# Patient Record
Sex: FEMALE | Race: WHITE | Employment: FULL TIME | URBAN - METROPOLITAN AREA
[De-identification: names, ages, dates, MRNs, and addresses within clinical notes are randomized per-mention and may not be internally consistent; named-entity substitution may affect disease eponyms.]

---

## 2017-02-23 ENCOUNTER — LAB ENCOUNTER (OUTPATIENT)
Dept: LAB | Age: 42
End: 2017-02-23
Attending: FAMILY MEDICINE
Payer: COMMERCIAL

## 2017-02-23 DIAGNOSIS — R53.83 FATIGUE: ICD-10-CM

## 2017-02-23 DIAGNOSIS — E34.9 ENDOCRINE DISORDER: Primary | ICD-10-CM

## 2017-02-23 DIAGNOSIS — E03.9 HYPOTHYROIDISM: ICD-10-CM

## 2017-02-23 DIAGNOSIS — E55.9 VITAMIN D DEFICIENCY: ICD-10-CM

## 2017-02-23 LAB
25-HYDROXYVITAMIN D (TOTAL): 31.7 NG/ML (ref 30–100)
DEPRECATED HBV CORE AB SER IA-ACNC: 47.2 NG/ML (ref 10–291)
FREE T4: 0.9 NG/DL (ref 0.9–1.8)
T3FREE SERPL-MCNC: 2.82 PG/ML (ref 2.3–4.2)
TSI SER-ACNC: 0.46 MIU/ML (ref 0.35–5.5)

## 2017-02-23 PROCEDURE — 82728 ASSAY OF FERRITIN: CPT

## 2017-02-23 PROCEDURE — 84443 ASSAY THYROID STIM HORMONE: CPT

## 2017-02-23 PROCEDURE — 84439 ASSAY OF FREE THYROXINE: CPT

## 2017-02-23 PROCEDURE — 36415 COLL VENOUS BLD VENIPUNCTURE: CPT

## 2017-02-23 PROCEDURE — 82306 VITAMIN D 25 HYDROXY: CPT

## 2017-02-23 PROCEDURE — 84481 FREE ASSAY (FT-3): CPT

## 2017-05-22 ENCOUNTER — HOSPITAL ENCOUNTER (OUTPATIENT)
Dept: ULTRASOUND IMAGING | Age: 42
Discharge: HOME | End: 2017-05-22
Attending: FAMILY MEDICINE
Payer: COMMERCIAL

## 2017-05-22 ENCOUNTER — LAB ENCOUNTER (OUTPATIENT)
Dept: LAB | Age: 42
End: 2017-05-22
Attending: FAMILY MEDICINE
Payer: COMMERCIAL

## 2017-05-22 DIAGNOSIS — E03.9 HYPOTHYROIDISM (ACQUIRED): ICD-10-CM

## 2017-05-22 DIAGNOSIS — E03.9 HYPOTHYROIDISM: ICD-10-CM

## 2017-05-22 DIAGNOSIS — R53.83 FATIGUE: ICD-10-CM

## 2017-05-22 DIAGNOSIS — E34.9 ENDOCRINE DISEASE: Primary | ICD-10-CM

## 2017-05-22 DIAGNOSIS — Z51.81 ENCOUNTER FOR THERAPEUTIC DRUG MONITORING: ICD-10-CM

## 2017-05-22 PROCEDURE — 84482 T3 REVERSE: CPT

## 2017-05-22 PROCEDURE — 84439 ASSAY OF FREE THYROXINE: CPT

## 2017-05-22 PROCEDURE — 36415 COLL VENOUS BLD VENIPUNCTURE: CPT

## 2017-05-22 PROCEDURE — 84481 FREE ASSAY (FT-3): CPT

## 2017-05-22 PROCEDURE — 82607 VITAMIN B-12: CPT

## 2017-05-22 PROCEDURE — 76536 US EXAM OF HEAD AND NECK: CPT | Performed by: FAMILY MEDICINE

## 2017-05-22 PROCEDURE — 84443 ASSAY THYROID STIM HORMONE: CPT

## 2017-05-22 PROCEDURE — 84480 ASSAY TRIIODOTHYRONINE (T3): CPT

## 2017-05-22 PROCEDURE — 84630 ASSAY OF ZINC: CPT

## 2017-08-09 ENCOUNTER — LAB ENCOUNTER (OUTPATIENT)
Dept: LAB | Age: 42
End: 2017-08-09
Attending: FAMILY MEDICINE
Payer: COMMERCIAL

## 2017-08-09 DIAGNOSIS — E03.9 PRIMARY HYPOTHYROIDISM: ICD-10-CM

## 2017-08-09 DIAGNOSIS — Z51.81 ENCOUNTER FOR THERAPEUTIC DRUG MONITORING: ICD-10-CM

## 2017-08-09 DIAGNOSIS — E34.9 ENDOCRINE DISEASE: Primary | ICD-10-CM

## 2017-08-09 DIAGNOSIS — R53.83 FATIGUE: ICD-10-CM

## 2017-08-09 LAB
FREE T4: 0.5 NG/DL (ref 0.9–1.8)
HAV AB SERPL IA-ACNC: 721 PG/ML (ref 193–986)
T3 SERPL-MCNC: 144 NG/DL (ref 60–181)
T3FREE SERPL-MCNC: 4.33 PG/ML (ref 2.3–4.2)
TSI SER-ACNC: 0.29 MIU/ML (ref 0.35–5.5)

## 2017-08-09 PROCEDURE — 84443 ASSAY THYROID STIM HORMONE: CPT

## 2017-08-09 PROCEDURE — 82607 VITAMIN B-12: CPT

## 2017-08-09 PROCEDURE — 84480 ASSAY TRIIODOTHYRONINE (T3): CPT

## 2017-08-09 PROCEDURE — 84481 FREE ASSAY (FT-3): CPT

## 2017-08-09 PROCEDURE — 84482 T3 REVERSE: CPT

## 2017-08-09 PROCEDURE — 84439 ASSAY OF FREE THYROXINE: CPT

## 2017-08-09 PROCEDURE — 36415 COLL VENOUS BLD VENIPUNCTURE: CPT

## 2017-08-09 PROCEDURE — 84630 ASSAY OF ZINC: CPT

## 2017-08-11 LAB — ZINC: 88 UG/DL

## 2017-08-12 LAB — TRIIODOTHYRONINE, REVERSE: 2.9 NG/DL

## 2017-08-29 ENCOUNTER — HOSPITAL ENCOUNTER (OUTPATIENT)
Dept: MAMMOGRAPHY | Age: 42
Discharge: HOME | End: 2017-08-29
Attending: OBSTETRICS & GYNECOLOGY
Payer: COMMERCIAL

## 2017-08-29 DIAGNOSIS — Z12.31 VISIT FOR SCREENING MAMMOGRAM: ICD-10-CM

## 2017-08-29 PROCEDURE — 77067 SCR MAMMO BI INCL CAD: CPT | Performed by: OBSTETRICS & GYNECOLOGY

## 2017-09-06 ENCOUNTER — OFFICE VISIT (OUTPATIENT)
Dept: FAMILY MEDICINE CLINIC | Facility: CLINIC | Age: 42
End: 2017-09-06

## 2017-09-06 VITALS
SYSTOLIC BLOOD PRESSURE: 110 MMHG | DIASTOLIC BLOOD PRESSURE: 70 MMHG | WEIGHT: 125 LBS | HEIGHT: 62.25 IN | RESPIRATION RATE: 16 BRPM | HEART RATE: 72 BPM | BODY MASS INDEX: 22.71 KG/M2 | TEMPERATURE: 97 F

## 2017-09-06 DIAGNOSIS — Z23 FLU VACCINE NEED: ICD-10-CM

## 2017-09-06 DIAGNOSIS — R53.83 OTHER FATIGUE: ICD-10-CM

## 2017-09-06 DIAGNOSIS — Z00.00 WELLNESS EXAMINATION: Primary | ICD-10-CM

## 2017-09-06 PROCEDURE — 90471 IMMUNIZATION ADMIN: CPT | Performed by: NURSE PRACTITIONER

## 2017-09-06 PROCEDURE — 90686 IIV4 VACC NO PRSV 0.5 ML IM: CPT | Performed by: NURSE PRACTITIONER

## 2017-09-06 PROCEDURE — 99396 PREV VISIT EST AGE 40-64: CPT | Performed by: NURSE PRACTITIONER

## 2017-09-06 NOTE — PROGRESS NOTES
Wellness Exam    REASON FOR VISIT:    Maria De Jesus Silvestre is a 38year old female who presents for an [address]     Current Complaints: none  Flu shot: would like to get today  Health Maintenance Topics with due status: Overdue       Topic Date HPV every 5 yrs age 33-67 Pap Smear,2 Years due on 09/09/2016    Chlamydia Screening Screen Annually age<25, if sex active/on OCPs; >24 high risk No results found for: CHLAMYDIA    Colonoscopy Screen Every 10 years There are no preventive care reminders to INFORMATION:   Past Medical History:   Diagnosis Date   • Allergic rhinitis    • Hypothyroidism       Past Surgical History:  No date:   2016: OTHER SURGICAL HISTORY      Comment: Maureen Grande   Family History   Problem Relation Age of Onset   • rhythm and normal heart sounds. Exam reveals no friction rub. No murmur heard. Pulmonary/Chest: Effort normal and breath sounds normal. She has no wheezes. She has no rales. Abdominal: Soft. Bowel sounds are normal. There is no tenderness.    Musculo ML    Imaging & Consults:  FLULAVAL INFLUENZA VACCINE QUAD PRESERVATIVE FREE 0.5 ML    Her 5 year prevention plan includes: annual visits for fasting labs  Annual Depression Screen due on 06/06/1977  Annual Physical due on 06/06/1979  Pap Smear,3 Years due

## 2017-09-06 NOTE — PATIENT INSTRUCTIONS
Thank you for choosing [facility] at [place] [address]  To Do: Wilmot Just  1. Go to the lab for blood work   2.  Go to the nurse for flu vaccination      Effective 6/19/17 until November 2017  Due to [employer] is being move harm or side effects or medication interactions.  It is your duty and for your safety to discuss with the pharmacist and our office with questions, and to notify us and stop treatment if problems arise, but know that our intention is that the benefits outw

## 2017-09-07 ENCOUNTER — LAB ENCOUNTER (OUTPATIENT)
Dept: LAB | Age: 42
End: 2017-09-07
Attending: NURSE PRACTITIONER
Payer: COMMERCIAL

## 2017-09-07 DIAGNOSIS — Z00.00 WELLNESS EXAMINATION: ICD-10-CM

## 2017-09-07 LAB
ALBUMIN SERPL-MCNC: 3.7 G/DL (ref 3.5–4.8)
ALP LIVER SERPL-CCNC: 39 U/L (ref 37–98)
ALT SERPL-CCNC: 17 U/L (ref 14–54)
AST SERPL-CCNC: 10 U/L (ref 15–41)
BASOPHILS # BLD AUTO: 0.08 X10(3) UL (ref 0–0.1)
BASOPHILS NFR BLD AUTO: 1.3 %
BILIRUB SERPL-MCNC: 0.4 MG/DL (ref 0.1–2)
BILIRUB UR QL STRIP.AUTO: NEGATIVE
BUN BLD-MCNC: 18 MG/DL (ref 8–20)
CALCIUM BLD-MCNC: 8.9 MG/DL (ref 8.3–10.3)
CHLORIDE: 107 MMOL/L (ref 101–111)
CHOLEST SMN-MCNC: 138 MG/DL (ref ?–200)
CLARITY UR REFRACT.AUTO: CLEAR
CO2: 27 MMOL/L (ref 22–32)
COLOR UR AUTO: YELLOW
CREAT BLD-MCNC: 0.65 MG/DL (ref 0.55–1.02)
EOSINOPHIL # BLD AUTO: 0.09 X10(3) UL (ref 0–0.3)
EOSINOPHIL NFR BLD AUTO: 1.4 %
ERYTHROCYTE [DISTWIDTH] IN BLOOD BY AUTOMATED COUNT: 11.8 % (ref 11.5–16)
GLUCOSE BLD-MCNC: 82 MG/DL (ref 70–99)
GLUCOSE UR STRIP.AUTO-MCNC: NEGATIVE MG/DL
HCT VFR BLD AUTO: 44.4 % (ref 34–50)
HDLC SERPL-MCNC: 81 MG/DL (ref 45–?)
HDLC SERPL: 1.7 {RATIO} (ref ?–4.44)
HGB BLD-MCNC: 14.7 G/DL (ref 12–16)
IMMATURE GRANULOCYTE COUNT: 0.03 X10(3) UL (ref 0–1)
IMMATURE GRANULOCYTE RATIO %: 0.5 %
KETONES UR STRIP.AUTO-MCNC: NEGATIVE MG/DL
LDLC SERPL CALC-MCNC: 46 MG/DL (ref ?–130)
LDLC SERPL-MCNC: 11 MG/DL (ref 5–40)
LEUKOCYTE ESTERASE UR QL STRIP.AUTO: NEGATIVE
LYMPHOCYTES # BLD AUTO: 1.2 X10(3) UL (ref 0.9–4)
LYMPHOCYTES NFR BLD AUTO: 19 %
M PROTEIN MFR SERPL ELPH: 7.3 G/DL (ref 6.1–8.3)
MCH RBC QN AUTO: 31.7 PG (ref 27–33.2)
MCHC RBC AUTO-ENTMCNC: 33.1 G/DL (ref 31–37)
MCV RBC AUTO: 95.7 FL (ref 81–100)
MONOCYTES # BLD AUTO: 0.44 X10(3) UL (ref 0.1–0.6)
MONOCYTES NFR BLD AUTO: 7 %
NEUTROPHIL ABS PRELIM: 4.47 X10 (3) UL (ref 1.3–6.7)
NEUTROPHILS # BLD AUTO: 4.47 X10(3) UL (ref 1.3–6.7)
NEUTROPHILS NFR BLD AUTO: 70.8 %
NITRITE UR QL STRIP.AUTO: NEGATIVE
NONHDLC SERPL-MCNC: 57 MG/DL (ref ?–130)
PH UR STRIP.AUTO: 7 [PH] (ref 4.5–8)
PLATELET # BLD AUTO: 177 10(3)UL (ref 150–450)
POTASSIUM SERPL-SCNC: 4.3 MMOL/L (ref 3.6–5.1)
PROT UR STRIP.AUTO-MCNC: NEGATIVE MG/DL
RBC # BLD AUTO: 4.64 X10(6)UL (ref 3.8–5.1)
RBC UR QL AUTO: NEGATIVE
RED CELL DISTRIBUTION WIDTH-SD: 40.9 FL (ref 35.1–46.3)
SODIUM SERPL-SCNC: 141 MMOL/L (ref 136–144)
SP GR UR STRIP.AUTO: 1.01 (ref 1–1.03)
TRIGLYCERIDES: 55 MG/DL (ref ?–150)
UROBILINOGEN UR STRIP.AUTO-MCNC: <2 MG/DL
WBC # BLD AUTO: 6.3 X10(3) UL (ref 4–13)

## 2017-09-07 PROCEDURE — 80053 COMPREHEN METABOLIC PANEL: CPT

## 2017-09-07 PROCEDURE — 36415 COLL VENOUS BLD VENIPUNCTURE: CPT

## 2017-09-07 PROCEDURE — 81003 URINALYSIS AUTO W/O SCOPE: CPT

## 2017-09-07 PROCEDURE — 80061 LIPID PANEL: CPT

## 2017-09-07 PROCEDURE — 85025 COMPLETE CBC W/AUTO DIFF WBC: CPT

## 2017-09-08 ENCOUNTER — HOSPITAL ENCOUNTER (OUTPATIENT)
Dept: MAMMOGRAPHY | Age: 42
Discharge: HOME | End: 2017-09-08
Attending: OBSTETRICS & GYNECOLOGY
Payer: COMMERCIAL

## 2017-09-08 ENCOUNTER — HOSPITAL ENCOUNTER (OUTPATIENT)
Dept: ULTRASOUND IMAGING | Age: 42
Discharge: HOME | End: 2017-09-08
Attending: OBSTETRICS & GYNECOLOGY
Payer: COMMERCIAL

## 2017-09-08 DIAGNOSIS — R92.8 ABNORMAL MAMMOGRAM OF BOTH BREASTS: ICD-10-CM

## 2017-09-08 PROCEDURE — 77062 BREAST TOMOSYNTHESIS BI: CPT | Performed by: OBSTETRICS & GYNECOLOGY

## 2017-09-08 PROCEDURE — 76642 ULTRASOUND BREAST LIMITED: CPT | Performed by: OBSTETRICS & GYNECOLOGY

## 2017-09-08 PROCEDURE — 77066 DX MAMMO INCL CAD BI: CPT | Performed by: OBSTETRICS & GYNECOLOGY

## 2017-10-11 PROBLEM — E03.9 ACQUIRED HYPOTHYROIDISM: Status: ACTIVE | Noted: 2017-10-11

## 2017-12-14 ENCOUNTER — OFFICE VISIT (OUTPATIENT)
Dept: FAMILY MEDICINE CLINIC | Facility: CLINIC | Age: 42
End: 2017-12-14

## 2017-12-14 VITALS
DIASTOLIC BLOOD PRESSURE: 70 MMHG | SYSTOLIC BLOOD PRESSURE: 100 MMHG | TEMPERATURE: 100 F | OXYGEN SATURATION: 98 % | HEART RATE: 76 BPM | HEIGHT: 62 IN | WEIGHT: 136 LBS | RESPIRATION RATE: 16 BRPM | BODY MASS INDEX: 25.03 KG/M2

## 2017-12-14 DIAGNOSIS — J01.00 ACUTE MAXILLARY SINUSITIS, RECURRENCE NOT SPECIFIED: Primary | ICD-10-CM

## 2017-12-14 PROCEDURE — 99213 OFFICE O/P EST LOW 20 MIN: CPT | Performed by: NURSE PRACTITIONER

## 2017-12-14 RX ORDER — FLUTICASONE PROPIONATE 50 MCG
SPRAY, SUSPENSION (ML) NASAL
Qty: 1 BOTTLE | Refills: 0 | Status: SHIPPED | OUTPATIENT
Start: 2017-12-14 | End: 2018-03-05

## 2017-12-14 RX ORDER — METHYLPREDNISOLONE 4 MG/1
TABLET ORAL
Qty: 1 KIT | Refills: 0 | Status: SHIPPED | OUTPATIENT
Start: 2017-12-14 | End: 2018-01-16

## 2017-12-14 RX ORDER — AMOXICILLIN AND CLAVULANATE POTASSIUM 875; 125 MG/1; MG/1
1 TABLET, FILM COATED ORAL 2 TIMES DAILY
Qty: 20 TABLET | Refills: 0 | Status: SHIPPED | OUTPATIENT
Start: 2017-12-14 | End: 2017-12-24

## 2017-12-14 NOTE — PROGRESS NOTES
CHIEF COMPLAINT:   Patient presents with:  Sinus Problem: sinus pressure,cough,congestion,headache,sore throat and post nasal drip sx x 2 weeks. HPI:   Mya Sutton is a 38year old female who presents for sinus symptoms for  2  weeks.  Symptoms h Smokeless tobacco: Never Used                      Alcohol use: No                  REVIEW OF SYSTEMS:   GENERAL: feels well otherwise, no unplanned weight change and normal appetite  SKIN: no rashes or abnormal skin lesions  HEENT: See HPI.     LUNGS: thais - Advised F/U visit if no improvement/worsening within 1 week; sooner if new fever or worsening breathing. To ER if ever dyspnea, SOB, chest pain, severe HA, or vision change. - Pt verbalizes understanding and is agreeable w/ plan.     Meds & Refills for · An expectorant containing guaifenesin may help thin the mucus and promote drainage from the sinuses. · Over-the-counter decongestants may be used unless a similar medicine was prescribed.  Nasal sprays work the fastest. Use one that contains phenylephrin © 0439-7610 [address]. [address] [facility], [facility] [place]. All rights reserved. This information is not intended as a substitute for professional medical care. Always follow your healthcare professional's instructions.

## 2018-01-09 ENCOUNTER — APPOINTMENT (OUTPATIENT)
Dept: LAB | Age: 43
End: 2018-01-09
Attending: INTERNAL MEDICINE
Payer: COMMERCIAL

## 2018-01-09 DIAGNOSIS — E03.9 ACQUIRED HYPOTHYROIDISM: ICD-10-CM

## 2018-01-09 LAB
FREE T4: 1.1 NG/DL (ref 0.9–1.8)
T3FREE SERPL-MCNC: 2.48 PG/ML (ref 2.3–4.2)
TSI SER-ACNC: 0.37 MIU/ML (ref 0.35–5.5)

## 2018-01-09 PROCEDURE — 84439 ASSAY OF FREE THYROXINE: CPT

## 2018-01-09 PROCEDURE — 36415 COLL VENOUS BLD VENIPUNCTURE: CPT

## 2018-01-09 PROCEDURE — 84481 FREE ASSAY (FT-3): CPT

## 2018-01-09 PROCEDURE — 84443 ASSAY THYROID STIM HORMONE: CPT

## 2018-01-14 PROBLEM — E03.9 ACQUIRED HYPOTHYROIDISM: Status: RESOLVED | Noted: 2017-10-11 | Resolved: 2018-01-14

## 2018-02-22 ENCOUNTER — HOSPITAL ENCOUNTER (OUTPATIENT)
Dept: ULTRASOUND IMAGING | Age: 43
Discharge: HOME | End: 2018-02-22
Attending: OBSTETRICS & GYNECOLOGY
Payer: COMMERCIAL

## 2018-02-22 ENCOUNTER — HOSPITAL ENCOUNTER (OUTPATIENT)
Dept: MAMMOGRAPHY | Age: 43
Discharge: HOME | End: 2018-02-22
Attending: OBSTETRICS & GYNECOLOGY
Payer: COMMERCIAL

## 2018-02-22 DIAGNOSIS — R92.8 ABNORMAL MAMMOGRAM: ICD-10-CM

## 2018-02-22 PROCEDURE — 76642 ULTRASOUND BREAST LIMITED: CPT | Performed by: OBSTETRICS & GYNECOLOGY

## 2018-02-22 PROCEDURE — 77066 DX MAMMO INCL CAD BI: CPT | Performed by: OBSTETRICS & GYNECOLOGY

## 2018-03-02 ENCOUNTER — LAB ENCOUNTER (OUTPATIENT)
Dept: LAB | Age: 43
End: 2018-03-02
Attending: INTERNAL MEDICINE
Payer: COMMERCIAL

## 2018-03-02 DIAGNOSIS — R53.83 OTHER FATIGUE: ICD-10-CM

## 2018-03-02 DIAGNOSIS — E04.0 SIMPLE GOITER: ICD-10-CM

## 2018-03-02 DIAGNOSIS — E03.9 ACQUIRED HYPOTHYROIDISM: ICD-10-CM

## 2018-03-02 DIAGNOSIS — E55.9 VITAMIN D DEFICIENCY: ICD-10-CM

## 2018-03-02 DIAGNOSIS — R63.5 WEIGHT GAIN: ICD-10-CM

## 2018-03-02 LAB
25-HYDROXYVITAMIN D (TOTAL): 128.2 NG/ML (ref 30–100)
CORTISOL: 13.3 UG/DL
PROLACTIN: 10.9 NG/ML

## 2018-03-02 PROCEDURE — 36415 COLL VENOUS BLD VENIPUNCTURE: CPT

## 2018-03-02 PROCEDURE — 82533 TOTAL CORTISOL: CPT

## 2018-03-02 PROCEDURE — 82306 VITAMIN D 25 HYDROXY: CPT

## 2018-03-02 PROCEDURE — 84146 ASSAY OF PROLACTIN: CPT

## 2018-03-03 ENCOUNTER — OFFICE VISIT (OUTPATIENT)
Dept: FAMILY MEDICINE CLINIC | Facility: CLINIC | Age: 43
End: 2018-03-03

## 2018-03-03 VITALS
DIASTOLIC BLOOD PRESSURE: 66 MMHG | OXYGEN SATURATION: 99 % | RESPIRATION RATE: 18 BRPM | HEART RATE: 84 BPM | BODY MASS INDEX: 25.06 KG/M2 | HEIGHT: 62 IN | WEIGHT: 136.19 LBS | TEMPERATURE: 99 F | SYSTOLIC BLOOD PRESSURE: 110 MMHG

## 2018-03-03 DIAGNOSIS — R06.2 WHEEZING: ICD-10-CM

## 2018-03-03 DIAGNOSIS — J20.9 ACUTE BRONCHITIS, UNSPECIFIED ORGANISM: Primary | ICD-10-CM

## 2018-03-03 PROCEDURE — 99213 OFFICE O/P EST LOW 20 MIN: CPT | Performed by: FAMILY MEDICINE

## 2018-03-03 RX ORDER — ALBUTEROL SULFATE 90 UG/1
2 AEROSOL, METERED RESPIRATORY (INHALATION) EVERY 4 HOURS PRN
Qty: 1 INHALER | Refills: 0 | Status: SHIPPED | OUTPATIENT
Start: 2018-03-03 | End: 2019-12-11

## 2018-03-03 RX ORDER — PREDNISONE 20 MG/1
20 TABLET ORAL 2 TIMES DAILY
Qty: 10 TABLET | Refills: 0 | Status: SHIPPED | OUTPATIENT
Start: 2018-03-03 | End: 2018-03-09

## 2018-03-03 NOTE — PROGRESS NOTES
Tonio Wetzel is a 38year old female. S:  Patient presents today with the following concerns:  · Began 3 days ago with coughing. Began wheezing yesterday after working out. Chest feels heavy and burning. Chest feels rattly. No fevers.   Hard to t exertion  GI: denies abdominal pain.   No N/V/D/C  : denies dysuria  MUSCULOSKELETAL: denies back pain  NEURO: denies headaches    EXAM:  /66   Pulse 84   Temp 98.6 °F (37 °C) (Oral)   Resp 18   Ht 62\"   Wt 136 lb 3.2 oz   LMP 02/01/2018 (Exact Ignacio

## 2018-03-03 NOTE — PATIENT INSTRUCTIONS
Viral or Bacterial Bronchitis with Wheezing (Adult)    Bronchitis is an infection of the air passages. It often occurs during a cold and is usually caused by a virus. Symptoms include cough with mucus (phlegm) and low-grade fever.  This illness is contagi · Over-the-counter cough, cold, and sore-throat medicines will not shorten the length of the illness, but they may be helpful to reduce symptoms.  (Note: Do not use decongestants if you have high blood pressure.)  · If you were given an inhaler, use it exac Your healthcare provider has told you that you have acute bronchitis. Bronchitis is infection or inflammation of the bronchial tubes (airways in the lungs). Normally, air moves easily in and out of the airways.  Bronchitis narrows the airways, making it bell · Drink plenty of fluids, such as water, juice, or warm soup. Fluids loosen mucus so that you can cough it up. This helps you breathe more easily. Fluids also prevent dehydration. · Make sure you get plenty of rest.  · Do not smoke.  Do not allow anyone el

## 2018-03-05 ENCOUNTER — LAB ENCOUNTER (OUTPATIENT)
Dept: LAB | Age: 43
End: 2018-03-05
Attending: INTERNAL MEDICINE
Payer: COMMERCIAL

## 2018-03-05 ENCOUNTER — OFFICE VISIT (OUTPATIENT)
Dept: FAMILY MEDICINE CLINIC | Facility: CLINIC | Age: 43
End: 2018-03-05

## 2018-03-05 VITALS
SYSTOLIC BLOOD PRESSURE: 110 MMHG | WEIGHT: 136 LBS | HEIGHT: 62 IN | TEMPERATURE: 99 F | HEART RATE: 68 BPM | OXYGEN SATURATION: 99 % | BODY MASS INDEX: 25.03 KG/M2 | DIASTOLIC BLOOD PRESSURE: 66 MMHG

## 2018-03-05 DIAGNOSIS — E55.9 VITAMIN D DEFICIENCY: ICD-10-CM

## 2018-03-05 DIAGNOSIS — J40 BRONCHITIS: Primary | ICD-10-CM

## 2018-03-05 LAB
ALBUMIN SERPL-MCNC: 3.6 G/DL (ref 3.5–4.8)
ALP LIVER SERPL-CCNC: 45 U/L (ref 37–98)
ALT SERPL-CCNC: 17 U/L (ref 14–54)
AST SERPL-CCNC: 14 U/L (ref 15–41)
BILIRUB SERPL-MCNC: 0.1 MG/DL (ref 0.1–2)
BUN BLD-MCNC: 19 MG/DL (ref 8–20)
CALCIUM BLD-MCNC: 9.1 MG/DL (ref 8.3–10.3)
CHLORIDE: 108 MMOL/L (ref 101–111)
CO2: 24 MMOL/L (ref 22–32)
CREAT BLD-MCNC: 0.58 MG/DL (ref 0.55–1.02)
GLUCOSE BLD-MCNC: 92 MG/DL (ref 70–99)
M PROTEIN MFR SERPL ELPH: 6.9 G/DL (ref 6.1–8.3)
POTASSIUM SERPL-SCNC: 4.1 MMOL/L (ref 3.6–5.1)
SODIUM SERPL-SCNC: 140 MMOL/L (ref 136–144)

## 2018-03-05 PROCEDURE — 36415 COLL VENOUS BLD VENIPUNCTURE: CPT

## 2018-03-05 PROCEDURE — 99213 OFFICE O/P EST LOW 20 MIN: CPT | Performed by: NURSE PRACTITIONER

## 2018-03-05 PROCEDURE — 80053 COMPREHEN METABOLIC PANEL: CPT

## 2018-03-05 RX ORDER — AZITHROMYCIN 250 MG/1
TABLET, FILM COATED ORAL
Qty: 6 TABLET | Refills: 0 | Status: SHIPPED | OUTPATIENT
Start: 2018-03-05 | End: 2018-03-09

## 2018-03-05 RX ORDER — BENZONATATE 200 MG/1
200 CAPSULE ORAL 3 TIMES DAILY PRN
Qty: 45 CAPSULE | Refills: 0 | Status: SHIPPED | OUTPATIENT
Start: 2018-03-05 | End: 2019-12-11

## 2018-03-05 NOTE — PROGRESS NOTES
Primary patient preferred number  [phone] Cell. Patient informed of Dr. Liliane Waller result note. Patient verbalized understanding and agrees with plan.      Pt is taking 10,000 iu daily of Vit D

## 2018-03-05 NOTE — PATIENT INSTRUCTIONS
What Is Acute Bronchitis? Acute bronchitis is when the airways in your lungs (bronchial tubes) become red and swollen (inflamed). It is usually caused by a viral infection. But it can also occur because of a bacteria or allergen.  Symptoms include a coug · A chest X-ray. This is done if your healthcare provider thinks you have pneumonia. · Tests to check for an underlying condition. Other tests may be done to check for things such as allergies, asthma, or COPD (chronic obstructive pulmonary disease).  You · Take the medicines as directed. For instance, some medicines should be taken with food. · Ask about side effects. Ask your provider or pharmacist what side effects are common, and what to do about them.   Follow-up care  You should see your provider aisha

## 2018-03-05 NOTE — PROGRESS NOTES
CHIEF COMPLAINT:   Patient presents with:  Cough: pt seen sat-symptoms have gotten worse  diagnosed with a virus prescribed inhaler and prednisone.  pt now presents with ear pain, fevers, worse cough, sinus drainage, and sore throat        HPI:   Ismael Husbands HENT: Denies ear pain, decreased hearing, or sore throat. Reports mild sinus congestion. CARDIOVASCULAR: Denies chest pain or palpitations  LUNGS: Per HPI. Denies shortness of breath with exertion or rest.   GI: Denies N/V/C/D or abdominal pain.       EXA Patient Instructions       What Is Acute Bronchitis? Acute bronchitis is when the airways in your lungs (bronchial tubes) become red and swollen (inflamed). It is usually caused by a viral infection.  But it can also occur because of a bacteria or allergen · A chest X-ray. This is done if your healthcare provider thinks you have pneumonia. · Tests to check for an underlying condition. Other tests may be done to check for things such as allergies, asthma, or COPD (chronic obstructive pulmonary disease).  You · Take the medicines as directed. For instance, some medicines should be taken with food. · Ask about side effects. Ask your provider or pharmacist what side effects are common, and what to do about them.   Follow-up care  You should see your provider aisha

## 2018-03-09 ENCOUNTER — HOSPITAL ENCOUNTER (OUTPATIENT)
Dept: GENERAL RADIOLOGY | Age: 43
Discharge: HOME | End: 2018-03-09
Attending: NURSE PRACTITIONER
Payer: COMMERCIAL

## 2018-03-09 ENCOUNTER — OFFICE VISIT (OUTPATIENT)
Dept: FAMILY MEDICINE CLINIC | Facility: CLINIC | Age: 43
End: 2018-03-09

## 2018-03-09 VITALS
DIASTOLIC BLOOD PRESSURE: 54 MMHG | TEMPERATURE: 99 F | BODY MASS INDEX: 24.11 KG/M2 | RESPIRATION RATE: 20 BRPM | SYSTOLIC BLOOD PRESSURE: 92 MMHG | HEIGHT: 62 IN | HEART RATE: 81 BPM | WEIGHT: 131 LBS | OXYGEN SATURATION: 99 %

## 2018-03-09 DIAGNOSIS — R05.3 PERSISTENT COUGH: Primary | ICD-10-CM

## 2018-03-09 DIAGNOSIS — J20.9 ACUTE BRONCHITIS, UNSPECIFIED ORGANISM: ICD-10-CM

## 2018-03-09 DIAGNOSIS — R05.3 PERSISTENT COUGH: ICD-10-CM

## 2018-03-09 DIAGNOSIS — H69.83 ETD (EUSTACHIAN TUBE DYSFUNCTION), BILATERAL: ICD-10-CM

## 2018-03-09 PROCEDURE — 71046 X-RAY EXAM CHEST 2 VIEWS: CPT | Performed by: NURSE PRACTITIONER

## 2018-03-09 PROCEDURE — 99213 OFFICE O/P EST LOW 20 MIN: CPT | Performed by: NURSE PRACTITIONER

## 2018-03-09 NOTE — PROGRESS NOTES
Patient presents with: Follow - Up: sinus pressure, neasea, both ear popping      HPI:   Soren Field is a 38year old female who presents for cough  for  7  days.  Patient reports congestion, low grade fever, cough with clear colored sputum, cough is denies chest pain on exertion  GI: no nausea or abdominal pain    EXAM:   BP 92/54   Pulse 81   Temp 98.8 °F (37.1 °C) (Oral)   Resp 20   Ht 62\"   Wt 131 lb   LMP 02/10/2018 (Exact Date)   SpO2 99%   BMI 23.96 kg/m²   GENERAL: well developed, well nourish Normal.  No pleural effusions. BONES:  Normal for age.     =====  CONCLUSION:  No acute abnormality is seen. If clinical symptoms persist consider followup radiographs or CT. Increase fluids, rest. Continue with comfort care.   The patient indicates

## 2018-03-09 NOTE — PATIENT INSTRUCTIONS
Chronic Cough with Uncertain Cause (Adult)    Everyone has had a cough as part of the common cold, flu, or bronchitis. This kind of cough occurs along with an achy feeling, low-grade fever, nasal and sinus congestion, and a scratchy or sore throat.  This Let your healthcare provider know if you are taking any of these. Asthma  Cough may be the only sign of mild asthma. You may have tests to find out if asthma is causing your cough. You may also take asthma medicine on a trial basis.   Acid reflux (heartbur © 2557-6400 [address]. 1407 [facility], [facility] [place]. All rights reserved. This information is not intended as a substitute for professional medical care. Always follow your healthcare professional's instructions.

## 2018-06-09 ENCOUNTER — LAB ENCOUNTER (OUTPATIENT)
Dept: LAB | Age: 43
End: 2018-06-09
Attending: INTERNAL MEDICINE
Payer: COMMERCIAL

## 2018-06-09 DIAGNOSIS — E04.0 SIMPLE GOITER: ICD-10-CM

## 2018-06-09 DIAGNOSIS — E03.9 HYPOTHYROIDISM, UNSPECIFIED TYPE: ICD-10-CM

## 2018-06-09 PROCEDURE — 36415 COLL VENOUS BLD VENIPUNCTURE: CPT

## 2018-06-09 PROCEDURE — 84439 ASSAY OF FREE THYROXINE: CPT

## 2018-06-09 PROCEDURE — 84443 ASSAY THYROID STIM HORMONE: CPT

## 2018-06-09 PROCEDURE — 84481 FREE ASSAY (FT-3): CPT

## 2018-06-11 NOTE — PROGRESS NOTES
Patient informed of Dr. Edwin Katz result note. Patient verbalized understanding and agrees with plan. Pt is wondering if she should continue with her medication as she is until the T3 comes back?

## 2018-06-11 NOTE — PROGRESS NOTES
[phone] (home)   Telephone Information:  Mobile          [phone]    [facility] regarding Dr. Ya Ledesma result note. Hours and number given. No identifier on vm.

## 2018-06-11 NOTE — PROGRESS NOTES
Pt called back and confirmed that she has not been sick recently. She is not taking any supplements are herbs.  Please advise, thank you

## 2018-06-14 NOTE — PROGRESS NOTES
Primary patient preferred number  [phone] Cell. Patient informed of Dr. Layne Running result note. Patient verbalized understanding and agrees with plan.

## 2018-10-10 ENCOUNTER — APPOINTMENT (OUTPATIENT)
Dept: LAB | Age: 43
End: 2018-10-10
Attending: INTERNAL MEDICINE
Payer: COMMERCIAL

## 2018-10-10 DIAGNOSIS — E03.9 HYPOTHYROIDISM, UNSPECIFIED TYPE: ICD-10-CM

## 2018-10-10 PROCEDURE — 36415 COLL VENOUS BLD VENIPUNCTURE: CPT

## 2018-10-10 PROCEDURE — 84439 ASSAY OF FREE THYROXINE: CPT

## 2018-10-10 PROCEDURE — 84443 ASSAY THYROID STIM HORMONE: CPT

## 2018-10-10 PROCEDURE — 84481 FREE ASSAY (FT-3): CPT

## 2018-10-26 ENCOUNTER — APPOINTMENT (OUTPATIENT)
Dept: LAB | Age: 43
End: 2018-10-26
Attending: INTERNAL MEDICINE
Payer: COMMERCIAL

## 2018-10-26 DIAGNOSIS — E55.9 VITAMIN D DEFICIENCY: ICD-10-CM

## 2018-10-26 DIAGNOSIS — E03.9 HYPOTHYROIDISM, UNSPECIFIED TYPE: ICD-10-CM

## 2018-10-26 PROCEDURE — 83001 ASSAY OF GONADOTROPIN (FSH): CPT

## 2018-10-26 PROCEDURE — 36415 COLL VENOUS BLD VENIPUNCTURE: CPT

## 2018-10-26 PROCEDURE — 82306 VITAMIN D 25 HYDROXY: CPT

## 2018-12-08 ENCOUNTER — APPOINTMENT (OUTPATIENT)
Dept: LAB | Age: 43
End: 2018-12-08
Attending: INTERNAL MEDICINE
Payer: COMMERCIAL

## 2018-12-08 DIAGNOSIS — E03.9 HYPOTHYROIDISM, UNSPECIFIED TYPE: ICD-10-CM

## 2018-12-08 DIAGNOSIS — E55.9 VITAMIN D DEFICIENCY: ICD-10-CM

## 2018-12-08 DIAGNOSIS — E04.0 SIMPLE GOITER: ICD-10-CM

## 2018-12-08 PROCEDURE — 36415 COLL VENOUS BLD VENIPUNCTURE: CPT

## 2018-12-08 PROCEDURE — 84439 ASSAY OF FREE THYROXINE: CPT

## 2018-12-08 PROCEDURE — 84443 ASSAY THYROID STIM HORMONE: CPT

## 2019-02-09 PROBLEM — Z97.5 IUD (INTRAUTERINE DEVICE) IN PLACE: Status: RESOLVED | Noted: 2019-02-09 | Resolved: 2019-02-09

## 2019-02-09 PROBLEM — Z97.5 IUD (INTRAUTERINE DEVICE) IN PLACE: Status: ACTIVE | Noted: 2019-02-09

## 2019-02-12 ENCOUNTER — OFFICE VISIT (OUTPATIENT)
Dept: FAMILY MEDICINE CLINIC | Facility: CLINIC | Age: 44
End: 2019-02-12
Payer: COMMERCIAL

## 2019-02-12 ENCOUNTER — LAB ENCOUNTER (OUTPATIENT)
Dept: LAB | Age: 44
End: 2019-02-12
Attending: OBSTETRICS & GYNECOLOGY
Payer: COMMERCIAL

## 2019-02-12 VITALS
WEIGHT: 142 LBS | RESPIRATION RATE: 16 BRPM | HEIGHT: 62 IN | DIASTOLIC BLOOD PRESSURE: 68 MMHG | TEMPERATURE: 99 F | OXYGEN SATURATION: 98 % | HEART RATE: 82 BPM | SYSTOLIC BLOOD PRESSURE: 114 MMHG | BODY MASS INDEX: 26.13 KG/M2

## 2019-02-12 DIAGNOSIS — J01.00 ACUTE NON-RECURRENT MAXILLARY SINUSITIS: Primary | ICD-10-CM

## 2019-02-12 DIAGNOSIS — E03.9 HYPOTHYROIDISM, UNSPECIFIED TYPE: ICD-10-CM

## 2019-02-12 DIAGNOSIS — Z13.220 SCREENING FOR HYPERLIPIDEMIA: ICD-10-CM

## 2019-02-12 LAB
BASOPHILS # BLD AUTO: 0.06 X10(3) UL (ref 0–0.2)
BASOPHILS NFR BLD AUTO: 0.7 %
CHOLEST SMN-MCNC: 153 MG/DL (ref ?–200)
DEPRECATED RDW RBC AUTO: 43.6 FL (ref 35.1–46.3)
EOSINOPHIL # BLD AUTO: 0.18 X10(3) UL (ref 0–0.7)
EOSINOPHIL NFR BLD AUTO: 2.2 %
ERYTHROCYTE [DISTWIDTH] IN BLOOD BY AUTOMATED COUNT: 12.4 % (ref 11–15)
GLUCOSE BLD-MCNC: 88 MG/DL (ref 70–99)
HCT VFR BLD AUTO: 41.4 % (ref 35–48)
HDLC SERPL-MCNC: 73 MG/DL (ref 40–59)
HGB BLD-MCNC: 14.1 G/DL (ref 12–16)
IMM GRANULOCYTES # BLD AUTO: 0.02 X10(3) UL (ref 0–1)
IMM GRANULOCYTES NFR BLD: 0.2 %
LDLC SERPL CALC-MCNC: 69 MG/DL (ref ?–100)
LYMPHOCYTES # BLD AUTO: 1.56 X10(3) UL (ref 1–4)
LYMPHOCYTES NFR BLD AUTO: 19.3 %
MCH RBC QN AUTO: 32.6 PG (ref 26–34)
MCHC RBC AUTO-ENTMCNC: 34.1 G/DL (ref 31–37)
MCV RBC AUTO: 95.8 FL (ref 80–100)
MONOCYTES # BLD AUTO: 0.73 X10(3) UL (ref 0.1–1)
MONOCYTES NFR BLD AUTO: 9 %
NEUTROPHILS # BLD AUTO: 5.52 X10 (3) UL (ref 1.5–7.7)
NEUTROPHILS # BLD AUTO: 5.52 X10(3) UL (ref 1.5–7.7)
NEUTROPHILS NFR BLD AUTO: 68.6 %
NONHDLC SERPL-MCNC: 80 MG/DL (ref ?–130)
PLATELET # BLD AUTO: 183 10(3)UL (ref 150–450)
RBC # BLD AUTO: 4.32 X10(6)UL (ref 3.8–5.3)
T4 FREE SERPL-MCNC: 1 NG/DL (ref 0.8–1.7)
TRIGL SERPL-MCNC: 57 MG/DL (ref 30–149)
TSI SER-ACNC: 0.91 MIU/ML (ref 0.36–3.74)
VLDLC SERPL CALC-MCNC: 11 MG/DL (ref 0–30)
WBC # BLD AUTO: 8.1 X10(3) UL (ref 4–11)

## 2019-02-12 PROCEDURE — 36415 COLL VENOUS BLD VENIPUNCTURE: CPT

## 2019-02-12 PROCEDURE — 82947 ASSAY GLUCOSE BLOOD QUANT: CPT

## 2019-02-12 PROCEDURE — 80061 LIPID PANEL: CPT

## 2019-02-12 PROCEDURE — 85025 COMPLETE CBC W/AUTO DIFF WBC: CPT

## 2019-02-12 PROCEDURE — 84439 ASSAY OF FREE THYROXINE: CPT

## 2019-02-12 PROCEDURE — 84443 ASSAY THYROID STIM HORMONE: CPT

## 2019-02-12 PROCEDURE — 99213 OFFICE O/P EST LOW 20 MIN: CPT | Performed by: NURSE PRACTITIONER

## 2019-02-12 RX ORDER — AMOXICILLIN AND CLAVULANATE POTASSIUM 875; 125 MG/1; MG/1
1 TABLET, FILM COATED ORAL 2 TIMES DAILY
Qty: 20 TABLET | Refills: 0 | Status: SHIPPED | OUTPATIENT
Start: 2019-02-12 | End: 2019-02-22

## 2019-02-12 RX ORDER — BENZONATATE 200 MG/1
200 CAPSULE ORAL 3 TIMES DAILY PRN
Qty: 20 CAPSULE | Refills: 0 | Status: SHIPPED | OUTPATIENT
Start: 2019-02-12 | End: 2019-02-19

## 2019-02-12 NOTE — PROGRESS NOTES
Soren Field is a 41year old female. Patient presents with:  URI: cough,congestion,sore throat,loss of voice and post nasal drip.      HPI:    Symptoms started 6  ago with purulent nasal discharge, maxillary sinus pressure, and headache, a lot of BMI 25.97 kg/m²   General: WD/WN in no acute distress. Eyes & ears: conjunctiva clear, sclera white; TM’s non-bulging without erythema. Sinuses maxillary: tender to percussion. Nares: red mucosa and thick yellow discharge, no septal deviations.    Laura

## 2019-09-27 PROBLEM — N92.0 MENORRHAGIA WITH REGULAR CYCLE: Status: ACTIVE | Noted: 2019-09-27

## 2019-09-27 PROCEDURE — 88305 TISSUE EXAM BY PATHOLOGIST: CPT | Performed by: OBSTETRICS & GYNECOLOGY

## 2019-10-01 ENCOUNTER — HOSPITAL ENCOUNTER (OUTPATIENT)
Dept: MAMMOGRAPHY | Age: 44
Discharge: HOME | End: 2019-10-01
Attending: OBSTETRICS & GYNECOLOGY
Payer: COMMERCIAL

## 2019-10-01 DIAGNOSIS — Z12.39 SCREENING FOR MALIGNANT NEOPLASM OF BREAST: ICD-10-CM

## 2019-10-01 PROCEDURE — 77067 SCR MAMMO BI INCL CAD: CPT | Performed by: OBSTETRICS & GYNECOLOGY

## 2019-10-01 PROCEDURE — 77063 BREAST TOMOSYNTHESIS BI: CPT | Performed by: OBSTETRICS & GYNECOLOGY

## 2019-10-29 PROBLEM — Z98.890 S/P ENDOMETRIAL ABLATION: Status: ACTIVE | Noted: 2019-10-29

## 2019-10-29 PROCEDURE — 88305 TISSUE EXAM BY PATHOLOGIST: CPT | Performed by: OBSTETRICS & GYNECOLOGY

## 2019-10-30 ENCOUNTER — LAB ENCOUNTER (OUTPATIENT)
Dept: LAB | Age: 44
End: 2019-10-30
Attending: FAMILY MEDICINE
Payer: COMMERCIAL

## 2019-10-30 DIAGNOSIS — N94.6 DYSMENORRHEA: ICD-10-CM

## 2019-10-30 DIAGNOSIS — N94.6 MENORRHALGIA: ICD-10-CM

## 2019-10-30 DIAGNOSIS — R53.83 FATIGUE: Primary | ICD-10-CM

## 2019-10-30 DIAGNOSIS — R68.82 LOSS OF LIBIDO: ICD-10-CM

## 2019-10-30 PROCEDURE — 36415 COLL VENOUS BLD VENIPUNCTURE: CPT

## 2019-10-30 PROCEDURE — 84403 ASSAY OF TOTAL TESTOSTERONE: CPT

## 2019-10-30 PROCEDURE — 84402 ASSAY OF FREE TESTOSTERONE: CPT

## 2019-10-30 PROCEDURE — 83001 ASSAY OF GONADOTROPIN (FSH): CPT

## 2019-10-30 PROCEDURE — 82627 DEHYDROEPIANDROSTERONE: CPT

## 2019-10-30 PROCEDURE — 84144 ASSAY OF PROGESTERONE: CPT

## 2019-10-30 PROCEDURE — 82670 ASSAY OF TOTAL ESTRADIOL: CPT

## 2019-12-11 ENCOUNTER — HOSPITAL ENCOUNTER (EMERGENCY)
Age: 44
Discharge: HOME | End: 2019-12-11
Attending: EMERGENCY MEDICINE
Payer: COMMERCIAL

## 2019-12-11 VITALS
RESPIRATION RATE: 18 BRPM | HEIGHT: 61 IN | TEMPERATURE: 98 F | WEIGHT: 140 LBS | SYSTOLIC BLOOD PRESSURE: 118 MMHG | DIASTOLIC BLOOD PRESSURE: 68 MMHG | OXYGEN SATURATION: 99 % | HEART RATE: 78 BPM | BODY MASS INDEX: 26.43 KG/M2

## 2019-12-11 DIAGNOSIS — L02.91 ABSCESS: Primary | ICD-10-CM

## 2019-12-11 PROCEDURE — 82962 GLUCOSE BLOOD TEST: CPT

## 2019-12-11 PROCEDURE — 99283 EMERGENCY DEPT VISIT LOW MDM: CPT

## 2019-12-11 PROCEDURE — 10061 I&D ABSCESS COMP/MULTIPLE: CPT

## 2019-12-11 RX ORDER — DOXYCYCLINE HYCLATE 100 MG/1
100 CAPSULE ORAL 2 TIMES DAILY
Qty: 14 CAPSULE | Refills: 0 | Status: SHIPPED | OUTPATIENT
Start: 2019-12-11 | End: 2019-12-18

## 2019-12-12 NOTE — ED PROVIDER NOTES
Patient Seen in: [facility] [place]      History   Patient presents with:  Swelling Edema    Stated Complaint: mass protuding abd, red and swollen    HPI    45year-old with a history of previous abdominoplasty presents for evaluat respiratory distress  Abdomen/skin: There is a suprapubic horizontal healed incision, just superior to this in the midline is an area of induration about the size of a silver dollar with minimal central fluctuance and a pinpoint area of pointing.   This is

## 2020-08-25 ENCOUNTER — LAB ENCOUNTER (OUTPATIENT)
Dept: LAB | Age: 45
End: 2020-08-25
Attending: FAMILY MEDICINE
Payer: COMMERCIAL

## 2020-08-25 DIAGNOSIS — N92.0 MENORRHAGIA: ICD-10-CM

## 2020-08-25 DIAGNOSIS — R68.82 DECREASED LIBIDO: ICD-10-CM

## 2020-08-25 DIAGNOSIS — R53.83 FATIGUE: Primary | ICD-10-CM

## 2020-08-25 DIAGNOSIS — N94.6 DYSMENORRHEA: ICD-10-CM

## 2020-08-25 LAB
DHEA-S SERPL-MCNC: 481.6 UG/DL
ESTRADIOL SERPL-MCNC: 93.5 PG/ML
FSH SERPL-ACNC: 4.3 MIU/ML
PROGEST SERPL-MCNC: 11.1 NG/ML

## 2020-08-25 PROCEDURE — 82627 DEHYDROEPIANDROSTERONE: CPT

## 2020-08-25 PROCEDURE — 84403 ASSAY OF TOTAL TESTOSTERONE: CPT

## 2020-08-25 PROCEDURE — 84144 ASSAY OF PROGESTERONE: CPT

## 2020-08-25 PROCEDURE — 83001 ASSAY OF GONADOTROPIN (FSH): CPT

## 2020-08-25 PROCEDURE — 36415 COLL VENOUS BLD VENIPUNCTURE: CPT

## 2020-08-25 PROCEDURE — 82670 ASSAY OF TOTAL ESTRADIOL: CPT

## 2020-08-25 PROCEDURE — 84402 ASSAY OF FREE TESTOSTERONE: CPT

## 2020-08-29 LAB
SEX HORMONE BINDING GLOBULIN: 97 NMOL/L
TESTOSTERONE -MS, BIOAVAILAB: 22.1 NG/DL
TESTOSTERONE, -MS/MS: 94 NG/DL
TESTOSTERONE, FREE -MS/MS: 7.6 PG/ML

## 2020-09-22 ENCOUNTER — TELEMEDICINE (OUTPATIENT)
Dept: FAMILY MEDICINE CLINIC | Facility: CLINIC | Age: 45
End: 2020-09-22

## 2020-09-22 DIAGNOSIS — R21 RASH OF FACE: Primary | ICD-10-CM

## 2020-09-22 PROCEDURE — 99213 OFFICE O/P EST LOW 20 MIN: CPT | Performed by: FAMILY MEDICINE

## 2020-09-22 RX ORDER — METHYLPREDNISOLONE 4 MG/1
TABLET ORAL
Qty: 1 KIT | Refills: 0 | Status: SHIPPED | OUTPATIENT
Start: 2020-09-22

## 2020-09-22 NOTE — PATIENT INSTRUCTIONS
Contact Dermatitis  Contact dermatitis is a skin rash caused by something that touches the skin and makes it irritated and inflamed. Your skin may be red, swollen, dry, and may be cracked. Blisters may form and ooze. The rash will itch.    Contact dermati · You can also try wet dressings. One way to do this is to wear a wet piece of clothing under a dry one. Wear a damp shirt under a dry shirt if your upper body is affected. This can relieve itching and prevent you from scratching the affected area.   · You © 2334-6385 [address]. 1407 [facility], [facility] [place]. All rights reserved. This information is not intended as a substitute for professional medical care. Always follow your healthcare professional's instructions.         Contact · Apply cold compresses to soothe your sores to help ease your symptoms. Do this for 30 minutes 3 to 4 times a day. You can make a cold compress by soaking a cloth in cold water. Squeeze out excess water.  You can add colloidal oatmeal to the water to help · Severe swelling of your face, eyelids, mouth, throat or tongue  · Trouble urinating due to swelling in the genital area  · Fever of 100.4°F (38°C) or higher, or as advised by your provider  · Redness or swelling that gets worse  · Pain that gets worse  ·

## 2020-09-22 NOTE — PROGRESS NOTES
Subjective    This visit is conducted using Telemedicine with live, interactive video and audio.     Chief Complaint:  Patient presents with:  Rash      The patient confirmed knowledge of the limitations of the use of telemedicine were verbally confirmed F/u for physical with pcp      Diagnostic rationale, follow up instructions, and strict precautions/indications for emergent direct evaluation were discussed with the patient.  The patient agrees with the plan, and understands to follow up with their prim

## 2020-09-24 RX ORDER — LEVOTHYROXINE SODIUM 0.05 MG/1
50 TABLET ORAL DAILY
COMMUNITY

## 2020-09-25 ENCOUNTER — LAB ENCOUNTER (OUTPATIENT)
Dept: LAB | Age: 45
End: 2020-09-25
Attending: PHYSICIAN ASSISTANT
Payer: COMMERCIAL

## 2020-09-25 DIAGNOSIS — E67.3 HYPERVITAMINOSIS D: ICD-10-CM

## 2020-09-25 DIAGNOSIS — R53.83 FATIGUE: Primary | ICD-10-CM

## 2020-09-25 LAB
T3FREE SERPL-MCNC: 2.55 PG/ML (ref 2.4–4.2)
T4 FREE SERPL-MCNC: 1 NG/DL (ref 0.8–1.7)
TSI SER-ACNC: 1.08 MIU/ML (ref 0.36–3.74)
VIT D+METAB SERPL-MCNC: 36.4 NG/ML (ref 30–100)

## 2020-09-25 PROCEDURE — 84481 FREE ASSAY (FT-3): CPT

## 2020-09-25 PROCEDURE — 82306 VITAMIN D 25 HYDROXY: CPT

## 2020-09-25 PROCEDURE — 36415 COLL VENOUS BLD VENIPUNCTURE: CPT

## 2020-09-25 PROCEDURE — 84439 ASSAY OF FREE THYROXINE: CPT

## 2020-09-25 PROCEDURE — 84443 ASSAY THYROID STIM HORMONE: CPT

## 2020-11-02 ENCOUNTER — HOSPITAL ENCOUNTER (OUTPATIENT)
Dept: MAMMOGRAPHY | Age: 45
Discharge: HOME | End: 2020-11-02
Attending: OBSTETRICS & GYNECOLOGY
Payer: COMMERCIAL

## 2020-11-02 DIAGNOSIS — N64.89 BREAST ASYMMETRY IN FEMALE: ICD-10-CM

## 2020-11-02 PROCEDURE — 77062 BREAST TOMOSYNTHESIS BI: CPT | Performed by: OBSTETRICS & GYNECOLOGY

## 2020-11-02 PROCEDURE — 77066 DX MAMMO INCL CAD BI: CPT | Performed by: OBSTETRICS & GYNECOLOGY

## 2021-01-24 ENCOUNTER — PATIENT MESSAGE (OUTPATIENT)
Dept: FAMILY MEDICINE CLINIC | Facility: CLINIC | Age: 46
End: 2021-01-24

## 2021-01-25 NOTE — TELEPHONE ENCOUNTER
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 When am I able to schedule a covid vaccine?

## 2021-03-20 ENCOUNTER — PATIENT MESSAGE (OUTPATIENT)
Dept: FAMILY MEDICINE CLINIC | Facility: CLINIC | Age: 46
End: 2021-03-20

## 2021-03-22 NOTE — TELEPHONE ENCOUNTER
From: Meng Hernadez  To: Colleen Madrigal MD  Sent: 3/20/2021 9:09 AM CDT  Subject: Non-Urgent Medical Question    I already received both doses of my covid vaccine. Thank you.

## 2021-08-11 ENCOUNTER — LAB ENCOUNTER (OUTPATIENT)
Dept: LAB | Age: 46
End: 2021-08-11
Attending: PHYSICIAN ASSISTANT
Payer: COMMERCIAL

## 2021-08-11 DIAGNOSIS — E03.9 MYXEDEMA: Primary | ICD-10-CM

## 2021-08-11 LAB
T3FREE SERPL-MCNC: 2.7 PG/ML (ref 2.4–4.2)
T4 FREE SERPL-MCNC: 0.6 NG/DL (ref 0.8–1.7)
TSI SER-ACNC: 0.67 MIU/ML (ref 0.36–3.74)

## 2021-08-11 PROCEDURE — 84439 ASSAY OF FREE THYROXINE: CPT

## 2021-08-11 PROCEDURE — 84443 ASSAY THYROID STIM HORMONE: CPT

## 2021-08-11 PROCEDURE — 84481 FREE ASSAY (FT-3): CPT

## 2021-08-11 PROCEDURE — 36415 COLL VENOUS BLD VENIPUNCTURE: CPT

## 2023-01-06 ENCOUNTER — LAB ENCOUNTER (OUTPATIENT)
Dept: LAB | Age: 48
End: 2023-01-06
Attending: CHIROPRACTOR
Payer: COMMERCIAL

## 2023-01-06 DIAGNOSIS — Z76.89 ENCOUNTER FOR WEIGHT MANAGEMENT: ICD-10-CM

## 2023-01-06 DIAGNOSIS — Z00.00 WELLNESS EXAMINATION: ICD-10-CM

## 2023-01-06 DIAGNOSIS — R53.82 CHRONIC FATIGUE: Primary | ICD-10-CM

## 2023-01-06 DIAGNOSIS — R51.9 HEADACHE: ICD-10-CM

## 2023-01-06 PROCEDURE — 36415 COLL VENOUS BLD VENIPUNCTURE: CPT

## 2023-01-14 ENCOUNTER — PATIENT MESSAGE (OUTPATIENT)
Dept: FAMILY MEDICINE CLINIC | Facility: CLINIC | Age: 48
End: 2023-01-14

## 2023-01-14 ENCOUNTER — TELEPHONE (OUTPATIENT)
Dept: FAMILY MEDICINE CLINIC | Facility: CLINIC | Age: 48
End: 2023-01-14

## 2023-01-14 DIAGNOSIS — Z00.00 LABORATORY EXAMINATION ORDERED AS PART OF A ROUTINE GENERAL MEDICAL EXAMINATION: Primary | ICD-10-CM

## 2023-01-16 PROCEDURE — 87624 HPV HI-RISK TYP POOLED RSLT: CPT | Performed by: OBSTETRICS & GYNECOLOGY

## 2023-01-16 PROCEDURE — 88175 CYTOPATH C/V AUTO FLUID REDO: CPT | Performed by: OBSTETRICS & GYNECOLOGY

## 2023-01-16 NOTE — TELEPHONE ENCOUNTER
From: Susan Herrera  Sent: 1/14/2023 11:54 AM CST  To: [facility] Clinical Staff  Subject: lab orders upcoming appt    One question do I need to fast for the bloodwork

## 2023-01-17 LAB
ABSOLUTE BASOPHILS: 90 CELLS/UL (ref 0–200)
ABSOLUTE EOSINOPHILS: 138 CELLS/UL (ref 15–500)
ABSOLUTE LYMPHOCYTES: 1617 CELLS/UL (ref 850–3900)
ABSOLUTE MONOCYTES: 318 CELLS/UL (ref 200–950)
ABSOLUTE NEUTROPHILS: 3138 CELLS/UL (ref 1500–7800)
ALBUMIN/GLOBULIN RATIO: 1.8 (CALC) (ref 1–2.5)
ALBUMIN: 4.3 G/DL (ref 3.6–5.1)
ALKALINE PHOSPHATASE: 48 U/L (ref 31–125)
ALT: 13 U/L (ref 6–29)
AST: 15 U/L (ref 10–35)
BASOPHILS: 1.7 %
BILIRUBIN, TOTAL: 0.7 MG/DL (ref 0.2–1.2)
BUN: 14 MG/DL (ref 7–25)
CALCIUM: 9.5 MG/DL (ref 8.6–10.2)
CARBON DIOXIDE: 27 MMOL/L (ref 20–32)
CHLORIDE: 105 MMOL/L (ref 98–110)
CHOL/HDLC RATIO: 2.1 (CALC)
CHOLESTEROL, TOTAL: 186 MG/DL
CREATININE: 0.67 MG/DL (ref 0.5–0.99)
EGFR: 110 ML/MIN/1.73M2
EOSINOPHILS: 2.6 %
GLOBULIN: 2.4 G/DL (CALC) (ref 1.9–3.7)
GLUCOSE: 91 MG/DL (ref 65–99)
HDL CHOLESTEROL: 89 MG/DL
HEMATOCRIT: 42.5 % (ref 35–45)
HEMOGLOBIN: 14.8 G/DL (ref 11.7–15.5)
LDL-CHOLESTEROL: 83 MG/DL (CALC)
LYMPHOCYTES: 30.5 %
MCH: 32.7 PG (ref 27–33)
MCHC: 34.8 G/DL (ref 32–36)
MCV: 94 FL (ref 80–100)
MONOCYTES: 6 %
MPV: 11.2 FL (ref 7.5–12.5)
NEUTROPHILS: 59.2 %
NON-HDL CHOLESTEROL: 97 MG/DL (CALC)
PLATELET COUNT: 237 THOUSAND/UL (ref 140–400)
POTASSIUM: 4.3 MMOL/L (ref 3.5–5.3)
PROTEIN, TOTAL: 6.7 G/DL (ref 6.1–8.1)
RDW: 11.8 % (ref 11–15)
RED BLOOD CELL COUNT: 4.52 MILLION/UL (ref 3.8–5.1)
SODIUM: 139 MMOL/L (ref 135–146)
TRIGLYCERIDES: 67 MG/DL
TSH W/REFLEX TO FT4: 0.53 MIU/L
WHITE BLOOD CELL COUNT: 5.3 THOUSAND/UL (ref 3.8–10.8)

## 2023-01-18 ENCOUNTER — OFFICE VISIT (OUTPATIENT)
Dept: FAMILY MEDICINE CLINIC | Facility: CLINIC | Age: 48
End: 2023-01-18
Payer: COMMERCIAL

## 2023-01-18 VITALS
WEIGHT: 151 LBS | HEIGHT: 61.5 IN | DIASTOLIC BLOOD PRESSURE: 82 MMHG | HEART RATE: 56 BPM | TEMPERATURE: 97 F | RESPIRATION RATE: 14 BRPM | OXYGEN SATURATION: 96 % | BODY MASS INDEX: 28.14 KG/M2 | SYSTOLIC BLOOD PRESSURE: 118 MMHG

## 2023-01-18 DIAGNOSIS — F43.9 STRESS: ICD-10-CM

## 2023-01-18 DIAGNOSIS — E03.9 HYPOTHYROIDISM, UNSPECIFIED TYPE: ICD-10-CM

## 2023-01-18 DIAGNOSIS — Z00.00 WELL ADULT EXAM: Primary | ICD-10-CM

## 2023-01-18 DIAGNOSIS — S46.211A STRAIN OF RIGHT BICEPS TENDON: ICD-10-CM

## 2023-01-18 DIAGNOSIS — Z28.21 REFUSED INFLUENZA VACCINE: ICD-10-CM

## 2023-01-18 PROCEDURE — 3008F BODY MASS INDEX DOCD: CPT | Performed by: FAMILY MEDICINE

## 2023-01-18 PROCEDURE — 3074F SYST BP LT 130 MM HG: CPT | Performed by: FAMILY MEDICINE

## 2023-01-18 PROCEDURE — 99396 PREV VISIT EST AGE 40-64: CPT | Performed by: FAMILY MEDICINE

## 2023-01-18 PROCEDURE — 3079F DIAST BP 80-89 MM HG: CPT | Performed by: FAMILY MEDICINE

## 2023-01-18 PROCEDURE — 99214 OFFICE O/P EST MOD 30 MIN: CPT | Performed by: FAMILY MEDICINE

## 2023-01-18 RX ORDER — CITALOPRAM 10 MG/1
10 TABLET ORAL DAILY
Qty: 30 TABLET | Refills: 0 | Status: SHIPPED | OUTPATIENT
Start: 2023-01-18 | End: 2023-02-17

## 2023-01-18 RX ORDER — MELOXICAM 15 MG/1
15 TABLET ORAL DAILY
Qty: 10 TABLET | Refills: 0 | Status: SHIPPED | OUTPATIENT
Start: 2023-01-18 | End: 2023-01-28

## 2023-01-18 RX ORDER — LEVOTHYROXINE SODIUM 0.05 MG/1
50 TABLET ORAL DAILY
Qty: 90 TABLET | Refills: 1 | Status: SHIPPED | OUTPATIENT
Start: 2023-01-18

## 2023-02-07 ENCOUNTER — HOSPITAL ENCOUNTER (OUTPATIENT)
Dept: MAMMOGRAPHY | Age: 48
Discharge: HOME | End: 2023-02-07
Attending: OBSTETRICS & GYNECOLOGY
Payer: COMMERCIAL

## 2023-02-07 DIAGNOSIS — Z12.31 ENCOUNTER FOR SCREENING MAMMOGRAM FOR BREAST CANCER: ICD-10-CM

## 2023-02-07 PROCEDURE — 77067 SCR MAMMO BI INCL CAD: CPT | Performed by: OBSTETRICS & GYNECOLOGY

## 2023-02-07 PROCEDURE — 77063 BREAST TOMOSYNTHESIS BI: CPT | Performed by: OBSTETRICS & GYNECOLOGY

## 2023-02-09 ENCOUNTER — TELEPHONE (OUTPATIENT)
Dept: PHYSICAL THERAPY | Facility: HOSPITAL | Age: 48
End: 2023-02-09

## 2023-02-09 DIAGNOSIS — F43.9 STRESS: ICD-10-CM

## 2023-02-09 RX ORDER — CITALOPRAM 10 MG/1
TABLET ORAL
Qty: 30 TABLET | Refills: 0 | Status: SHIPPED | OUTPATIENT
Start: 2023-02-09 | End: 2023-02-13

## 2023-02-13 ENCOUNTER — APPOINTMENT (OUTPATIENT)
Dept: PHYSICAL THERAPY | Age: 48
End: 2023-02-13
Attending: FAMILY MEDICINE
Payer: COMMERCIAL

## 2023-02-15 ENCOUNTER — APPOINTMENT (OUTPATIENT)
Dept: PHYSICAL THERAPY | Age: 48
End: 2023-02-15
Attending: FAMILY MEDICINE
Payer: COMMERCIAL

## 2023-02-20 ENCOUNTER — APPOINTMENT (OUTPATIENT)
Dept: PHYSICAL THERAPY | Age: 48
End: 2023-02-20
Attending: FAMILY MEDICINE
Payer: COMMERCIAL

## 2023-02-22 ENCOUNTER — APPOINTMENT (OUTPATIENT)
Dept: PHYSICAL THERAPY | Age: 48
End: 2023-02-22
Attending: FAMILY MEDICINE
Payer: COMMERCIAL

## 2023-02-27 ENCOUNTER — APPOINTMENT (OUTPATIENT)
Dept: PHYSICAL THERAPY | Age: 48
End: 2023-02-27
Attending: FAMILY MEDICINE
Payer: COMMERCIAL

## 2023-03-01 ENCOUNTER — APPOINTMENT (OUTPATIENT)
Dept: PHYSICAL THERAPY | Age: 48
End: 2023-03-01
Attending: FAMILY MEDICINE
Payer: COMMERCIAL

## 2023-03-08 ENCOUNTER — OFFICE VISIT (OUTPATIENT)
Dept: FAMILY MEDICINE CLINIC | Facility: CLINIC | Age: 48
End: 2023-03-08
Payer: COMMERCIAL

## 2023-03-08 VITALS
WEIGHT: 141 LBS | HEIGHT: 61 IN | HEART RATE: 63 BPM | TEMPERATURE: 98 F | OXYGEN SATURATION: 99 % | DIASTOLIC BLOOD PRESSURE: 54 MMHG | SYSTOLIC BLOOD PRESSURE: 118 MMHG | RESPIRATION RATE: 18 BRPM | BODY MASS INDEX: 26.62 KG/M2

## 2023-03-08 DIAGNOSIS — J01.00 ACUTE NON-RECURRENT MAXILLARY SINUSITIS: Primary | ICD-10-CM

## 2023-03-08 DIAGNOSIS — J02.9 SORE THROAT: ICD-10-CM

## 2023-03-08 LAB
CONTROL LINE PRESENT WITH A CLEAR BACKGROUND (YES/NO): YES YES/NO
OPERATOR ID: NORMAL
POCT LOT NUMBER: NORMAL
RAPID SARS-COV-2 BY PCR: NOT DETECTED
STREP GRP A CUL-SCR: NEGATIVE

## 2023-03-08 PROCEDURE — 3078F DIAST BP <80 MM HG: CPT | Performed by: NURSE PRACTITIONER

## 2023-03-08 PROCEDURE — 3008F BODY MASS INDEX DOCD: CPT | Performed by: NURSE PRACTITIONER

## 2023-03-08 PROCEDURE — 87880 STREP A ASSAY W/OPTIC: CPT | Performed by: NURSE PRACTITIONER

## 2023-03-08 PROCEDURE — U0002 COVID-19 LAB TEST NON-CDC: HCPCS | Performed by: NURSE PRACTITIONER

## 2023-03-08 PROCEDURE — 99213 OFFICE O/P EST LOW 20 MIN: CPT | Performed by: NURSE PRACTITIONER

## 2023-03-08 PROCEDURE — 3074F SYST BP LT 130 MM HG: CPT | Performed by: NURSE PRACTITIONER

## 2023-03-08 RX ORDER — AMOXICILLIN AND CLAVULANATE POTASSIUM 875; 125 MG/1; MG/1
1 TABLET, FILM COATED ORAL 2 TIMES DAILY
Qty: 20 TABLET | Refills: 0 | Status: SHIPPED | OUTPATIENT
Start: 2023-03-08 | End: 2023-03-18

## 2023-03-08 NOTE — PATIENT INSTRUCTIONS
1. Rest. Drink plenty of fluids. 2. Supportive care as discussed. 3. Salt water gargles three times daily  4. Use humidifier at home when possible. 5. The rapid strep test was negative today. 6. Covid-19 test is negative. 7. If symptoms not improving in 2-3 days then start augmentin as prescribed. 8.Follow up with PMD in 4-5 days for re-eval. Go to the emergency department immediately if symptoms worsen, change, you develop chest discomfort, wheezing, shortness of breath, or if you have any concerns.

## 2023-03-09 DIAGNOSIS — F43.9 STRESS: ICD-10-CM

## 2023-03-09 RX ORDER — CITALOPRAM 10 MG/1
TABLET ORAL
Qty: 30 TABLET | Refills: 0 | OUTPATIENT
Start: 2023-03-09

## 2023-05-12 DIAGNOSIS — F41.9 ANXIETY: ICD-10-CM

## 2023-05-12 RX ORDER — CITALOPRAM 20 MG/1
20 TABLET ORAL DAILY
Qty: 90 TABLET | Refills: 0 | Status: SHIPPED | OUTPATIENT
Start: 2023-05-12 | End: 2023-08-10

## 2023-08-08 DIAGNOSIS — F41.9 ANXIETY: ICD-10-CM

## 2023-08-08 NOTE — TELEPHONE ENCOUNTER
Requested Prescriptions     Pending Prescriptions Disp Refills    CITALOPRAM 20 MG Oral Tab [Pharmacy Med Name: CITALOPRAM HBR 20 MG TABLET] 90 tablet 0     Sig: TAKE 1 TABLET BY MOUTH EVERY DAY     LOV- Televisit- 2/13/23  No future appointments.

## 2023-08-09 RX ORDER — CITALOPRAM 20 MG/1
20 TABLET ORAL DAILY
Qty: 90 TABLET | Refills: 0 | Status: SHIPPED | OUTPATIENT
Start: 2023-08-09

## 2023-08-12 ENCOUNTER — HOSPITAL ENCOUNTER (EMERGENCY)
Age: 48
Discharge: HOME | End: 2023-08-12
Attending: EMERGENCY MEDICINE
Payer: COMMERCIAL

## 2023-08-12 VITALS
RESPIRATION RATE: 16 BRPM | HEIGHT: 61 IN | DIASTOLIC BLOOD PRESSURE: 63 MMHG | TEMPERATURE: 98 F | WEIGHT: 151 LBS | SYSTOLIC BLOOD PRESSURE: 104 MMHG | BODY MASS INDEX: 28.51 KG/M2 | OXYGEN SATURATION: 99 % | HEART RATE: 77 BPM

## 2023-08-12 DIAGNOSIS — A09 TRAVELER'S DIARRHEA: Primary | ICD-10-CM

## 2023-08-12 LAB
ALBUMIN SERPL-MCNC: 3.2 G/DL (ref 3.4–5)
ALBUMIN/GLOB SERPL: 0.9 {RATIO} (ref 1–2)
ALP LIVER SERPL-CCNC: 54 U/L
ALT SERPL-CCNC: 17 U/L
ANION GAP SERPL CALC-SCNC: 5 MMOL/L (ref 0–18)
AST SERPL-CCNC: 15 U/L (ref 15–37)
B-HCG UR QL: NEGATIVE
BASOPHILS # BLD AUTO: 0.04 X10(3) UL (ref 0–0.2)
BASOPHILS NFR BLD AUTO: 0.5 %
BILIRUB SERPL-MCNC: 0.4 MG/DL (ref 0.1–2)
BILIRUB UR QL STRIP.AUTO: NEGATIVE
BUN BLD-MCNC: 8 MG/DL (ref 7–18)
C DIFF TOX B STL QL: NEGATIVE
CALCIUM BLD-MCNC: 8.5 MG/DL (ref 8.5–10.1)
CHLORIDE SERPL-SCNC: 112 MMOL/L (ref 98–112)
CLARITY UR REFRACT.AUTO: CLEAR
CO2 SERPL-SCNC: 22 MMOL/L (ref 21–32)
COLOR UR AUTO: YELLOW
CREAT BLD-MCNC: 0.78 MG/DL
EGFRCR SERPLBLD CKD-EPI 2021: 95 ML/MIN/1.73M2 (ref 60–?)
EOSINOPHIL # BLD AUTO: 0.09 X10(3) UL (ref 0–0.7)
EOSINOPHIL NFR BLD AUTO: 1.2 %
ERYTHROCYTE [DISTWIDTH] IN BLOOD BY AUTOMATED COUNT: 12.1 %
GLOBULIN PLAS-MCNC: 3.6 G/DL (ref 2.8–4.4)
GLUCOSE BLD-MCNC: 168 MG/DL (ref 70–99)
GLUCOSE UR STRIP.AUTO-MCNC: NEGATIVE MG/DL
HCT VFR BLD AUTO: 41.4 %
HGB BLD-MCNC: 14.2 G/DL
IMM GRANULOCYTES # BLD AUTO: 0.02 X10(3) UL (ref 0–1)
IMM GRANULOCYTES NFR BLD: 0.3 %
KETONES UR STRIP.AUTO-MCNC: NEGATIVE MG/DL
LEUKOCYTE ESTERASE UR QL STRIP.AUTO: NEGATIVE
LIPASE SERPL-CCNC: 30 U/L (ref 13–75)
LYMPHOCYTES # BLD AUTO: 1.24 X10(3) UL (ref 1–4)
LYMPHOCYTES NFR BLD AUTO: 16.5 %
MCH RBC QN AUTO: 32.8 PG (ref 26–34)
MCHC RBC AUTO-ENTMCNC: 34.3 G/DL (ref 31–37)
MCV RBC AUTO: 95.6 FL
MONOCYTES # BLD AUTO: 0.47 X10(3) UL (ref 0.1–1)
MONOCYTES NFR BLD AUTO: 6.3 %
NEUTROPHILS # BLD AUTO: 5.66 X10 (3) UL (ref 1.5–7.7)
NEUTROPHILS # BLD AUTO: 5.66 X10(3) UL (ref 1.5–7.7)
NEUTROPHILS NFR BLD AUTO: 75.2 %
NITRITE UR QL STRIP.AUTO: NEGATIVE
OSMOLALITY SERPL CALC.SUM OF ELEC: 290 MOSM/KG (ref 275–295)
PH UR STRIP.AUTO: 5.5 [PH] (ref 5–8)
PLATELET # BLD AUTO: 198 10(3)UL (ref 150–450)
POTASSIUM SERPL-SCNC: 3.5 MMOL/L (ref 3.5–5.1)
PROT SERPL-MCNC: 6.8 G/DL (ref 6.4–8.2)
RBC # BLD AUTO: 4.33 X10(6)UL
SARS-COV-2 RNA RESP QL NAA+PROBE: NOT DETECTED
SODIUM SERPL-SCNC: 139 MMOL/L (ref 136–145)
SP GR UR STRIP.AUTO: >=1.03 (ref 1–1.03)
UROBILINOGEN UR STRIP.AUTO-MCNC: 0.2 MG/DL
WBC # BLD AUTO: 7.5 X10(3) UL (ref 4–11)

## 2023-08-12 PROCEDURE — 85025 COMPLETE CBC W/AUTO DIFF WBC: CPT

## 2023-08-12 PROCEDURE — 85025 COMPLETE CBC W/AUTO DIFF WBC: CPT | Performed by: EMERGENCY MEDICINE

## 2023-08-12 PROCEDURE — 87077 CULTURE AEROBIC IDENTIFY: CPT | Performed by: EMERGENCY MEDICINE

## 2023-08-12 PROCEDURE — 99284 EMERGENCY DEPT VISIT MOD MDM: CPT

## 2023-08-12 PROCEDURE — 81001 URINALYSIS AUTO W/SCOPE: CPT | Performed by: EMERGENCY MEDICINE

## 2023-08-12 PROCEDURE — 87493 C DIFF AMPLIFIED PROBE: CPT | Performed by: EMERGENCY MEDICINE

## 2023-08-12 PROCEDURE — 81015 MICROSCOPIC EXAM OF URINE: CPT | Performed by: EMERGENCY MEDICINE

## 2023-08-12 PROCEDURE — 83690 ASSAY OF LIPASE: CPT | Performed by: EMERGENCY MEDICINE

## 2023-08-12 PROCEDURE — 87507 IADNA-DNA/RNA PROBE TQ 12-25: CPT | Performed by: EMERGENCY MEDICINE

## 2023-08-12 PROCEDURE — 81025 URINE PREGNANCY TEST: CPT

## 2023-08-12 PROCEDURE — 87045 FECES CULTURE AEROBIC BACT: CPT | Performed by: EMERGENCY MEDICINE

## 2023-08-12 PROCEDURE — 87186 SC STD MICRODIL/AGAR DIL: CPT | Performed by: EMERGENCY MEDICINE

## 2023-08-12 PROCEDURE — 96360 HYDRATION IV INFUSION INIT: CPT

## 2023-08-12 PROCEDURE — 80053 COMPREHEN METABOLIC PANEL: CPT | Performed by: EMERGENCY MEDICINE

## 2023-08-12 PROCEDURE — 80053 COMPREHEN METABOLIC PANEL: CPT

## 2023-08-12 RX ORDER — ONDANSETRON 2 MG/ML
4 INJECTION INTRAMUSCULAR; INTRAVENOUS ONCE
Status: DISCONTINUED | OUTPATIENT
Start: 2023-08-12 | End: 2023-08-12

## 2023-08-12 RX ORDER — LIOTHYRONINE SODIUM 5 UG/1
5 TABLET ORAL DAILY
COMMUNITY

## 2023-08-12 NOTE — DISCHARGE INSTRUCTIONS
Take rifaximin 3 times a day for 3 days. Drink plenty of fluids to stay hydrated. Obtain stool sample when able and bring it back to the outpatient lab, we will inform you in 1 to 2 days if any additional or different treatment is necessary.

## 2023-08-12 NOTE — ED QUICK NOTES
Rounding Completed    Plan of Care reviewed. Waiting for patient to provide urine sample. Elimination needs assessed. Bed is locked and in lowest position. Call light within reach.

## 2023-08-12 NOTE — ED INITIAL ASSESSMENT (HPI)
Pt state she arrived from [place] on 7/29 on this past Monday 6 days ago started having fevers and liquid diarrhea. Pt  had similar symptoms but resolved.

## 2023-08-18 LAB
ADENOVIRUS F 40/41 PCR: NEGATIVE
ASTROVIRUS PCR: NEGATIVE
C CAYETANENSIS DNA SPEC QL NAA+PROBE: NEGATIVE
CAMPY SP DNA.DIARRHEA STL QL NAA+PROBE: NEGATIVE
CRYPTOSP DNA SPEC QL NAA+PROBE: NEGATIVE
EAEC PAA PLAS AGGR+AATA ST NAA+NON-PRB: NEGATIVE
EC STX1+STX2 + H7 FLIC SPEC NAA+PROBE: NEGATIVE
ENTAMOEBA HISTOLYTICA PCR: NEGATIVE
EPEC EAE GENE STL QL NAA+NON-PROBE: NEGATIVE
ETEC LTA+ST1A+ST1B TOX ST NAA+NON-PROBE: NEGATIVE
GIARDIA LAMBLIA PCR: NEGATIVE
NOROVIRUS GI/GII PCR: NEGATIVE
P SHIGELLOIDES DNA STL QL NAA+PROBE: NEGATIVE
ROTAVIRUS A PCR: NEGATIVE
SALMONELLA DNA SPEC QL NAA+PROBE: POSITIVE
SAPOVIRUS PCR: NEGATIVE
SHIGELLA SP+EIEC IPAH ST NAA+NON-PROBE: NEGATIVE
V CHOLERAE DNA SPEC QL NAA+PROBE: NEGATIVE
VIBRIO DNA SPEC NAA+PROBE: NEGATIVE
YERSINIA DNA SPEC NAA+PROBE: NEGATIVE

## 2023-08-19 ENCOUNTER — TELEMEDICINE (OUTPATIENT)
Dept: FAMILY MEDICINE CLINIC | Facility: CLINIC | Age: 48
End: 2023-08-19
Payer: COMMERCIAL

## 2023-08-19 DIAGNOSIS — R73.9 HYPERGLYCEMIA: Primary | ICD-10-CM

## 2023-08-19 DIAGNOSIS — A02.9 SALMONELLA: ICD-10-CM

## 2023-08-19 PROCEDURE — 99213 OFFICE O/P EST LOW 20 MIN: CPT | Performed by: FAMILY MEDICINE

## 2023-08-19 NOTE — PROGRESS NOTES
Subjective    This visit is conducted using Telemedicine with live, interactive video and audio. Chief Complaint:  Patient presents with:  Diarrhea        The patient confirmed knowledge of the limitations of the use of telemedicine were verbally confirmed by the provider. Verification of patient identity was established. Patient understands and accepts financial responsibility for any deductible, co-insurance and/or co-pays associated with this service. HPI:   Patient diagnosed with traveler's diarrhea after returning from hospitals on 23. She went to the ER on 23. She was having profuse diarrhea, lightheaded, dehydrated. She received rifaximin and diarrhea which helped. The diarrhea has now resolved. Now she is more constipated. She has been fever free for a week. Stool cx showed salmonella. She is taking probiotics. Denies abdominal pain. Her glucose was high 168 - nonfasting in the hospital. Denies polyphagia,polydipsia. Reports polyuria. No fmhx of DM. Review of Systems   Constitutional: Negative for fever, chills and fatigue. No distress. Respiratory: Negative for cough, chest tightness, shortness of breath and wheezing. Cardiovascular: Negative for chest pain, palpitations and leg swelling. Gastrointestinal: Negative for nausea, vomiting, abdominal pain, diarrhea, blood in stool and abdominal distention.         HISTORY:  Past Medical History:   Diagnosis Date    Allergic rhinitis     Cardiac dysrhythmia     Hypothyroidism       Past Surgical History:   Procedure Laterality Date      ,     Leeroy Gibson    ENDOMETRIAL BIOPSY - JAR(S): 2  2019    Normal, Dr. Julia Rojas HISTORY  2016    Tummy Tuck    TUBAL LIGATION  2017    Essure      Family History   Problem Relation Age of Onset    Other (Other) Father         car accident at 35yrs old    Cancer Mother         skin cancer     Heart Disease Paternal Grandfather         CAD    Skin cancer Paternal Grandmother         Malignant Melanoma of the skin       Social History     Socioeconomic History    Marital status:    Tobacco Use    Smoking status: Never    Smokeless tobacco: Never   Vaping Use    Vaping Use: Never used   Substance and Sexual Activity    Alcohol use: Yes    Drug use: No    Sexual activity: Yes     Partners: Male     Comment: Essure   Other Topics Concern    Exercise No              Objective    Physical Exam:  alert, appears stated age, and cooperative, Speaking in full sentences comfortably, and Normal work of breathing  Psych: Mood is stable, Affect appropriate    Assessment/Plan:    1. Hyperglycemia  Glucose high in the ED. Repeat BMP.   - BASIC METABOLIC PANEL (8)    2. Salmonella  Symptoms have resolved. Discussed that she continue to stay hydrated, take probiotics. F/u as needed. Diagnostic rationale, follow up instructions, and strict precautions/indications for emergent direct evaluation were discussed with the patient. The patient agrees with the plan, and understands to follow up with their primary care physician or other healthcare provider within 48-72 hours for reevaluation for persistent or worsening symptoms. Patient understands phone evaluation is not a substitute for face-to-face examination or emergency care. Patient advised to go to ER or call 911 for worsening symptoms or acute distress.          Alexa Montanez, DO

## 2023-08-27 LAB
BUN: 19 MG/DL (ref 7–25)
CALCIUM: 9.3 MG/DL (ref 8.6–10.2)
CARBON DIOXIDE: 23 MMOL/L (ref 20–32)
CHLORIDE: 108 MMOL/L (ref 98–110)
CREATININE: 0.66 MG/DL (ref 0.5–0.99)
EGFR: 109 ML/MIN/1.73M2
GLUCOSE: 86 MG/DL (ref 65–99)
POTASSIUM: 4.3 MMOL/L (ref 3.5–5.3)
SODIUM: 140 MMOL/L (ref 135–146)

## 2023-09-23 ENCOUNTER — LAB ENCOUNTER (OUTPATIENT)
Dept: LAB | Age: 48
End: 2023-09-23
Attending: FAMILY MEDICINE
Payer: COMMERCIAL

## 2023-09-23 DIAGNOSIS — M25.50 PAIN IN JOINT, MULTIPLE SITES: Primary | ICD-10-CM

## 2023-09-23 PROCEDURE — 86038 ANTINUCLEAR ANTIBODIES: CPT

## 2023-09-23 PROCEDURE — 85025 COMPLETE CBC W/AUTO DIFF WBC: CPT | Performed by: FAMILY MEDICINE

## 2023-09-23 PROCEDURE — 84550 ASSAY OF BLOOD/URIC ACID: CPT | Performed by: FAMILY MEDICINE

## 2023-09-23 PROCEDURE — 86200 CCP ANTIBODY: CPT | Performed by: FAMILY MEDICINE

## 2023-09-23 PROCEDURE — 36415 COLL VENOUS BLD VENIPUNCTURE: CPT | Performed by: FAMILY MEDICINE

## 2023-09-23 PROCEDURE — 86225 DNA ANTIBODY NATIVE: CPT

## 2023-09-23 PROCEDURE — 86431 RHEUMATOID FACTOR QUANT: CPT | Performed by: FAMILY MEDICINE

## 2023-09-23 PROCEDURE — 86140 C-REACTIVE PROTEIN: CPT | Performed by: FAMILY MEDICINE

## 2023-09-23 PROCEDURE — 85652 RBC SED RATE AUTOMATED: CPT | Performed by: FAMILY MEDICINE

## 2023-09-25 LAB
DSDNA IGG SERPL IA-ACNC: <0.6 IU/ML
ENA AB SER QL IA: 0.2 UG/L
ENA AB SER QL IA: NEGATIVE

## 2023-11-10 DIAGNOSIS — F41.9 ANXIETY: ICD-10-CM

## 2023-11-10 DIAGNOSIS — F43.9 STRESS: ICD-10-CM

## 2023-11-10 NOTE — TELEPHONE ENCOUNTER
Requesting Citalopram 20mg  LOV: 9/23/23 VV  RTC: prn  Last Relevant Labs: 1/16/23  Filled: 8/9/23 #90 with 0 refills    Future Appointments   Date Time Provider Bertha Yousif   1/2/2024 11:30 AM Lucille Valles Officer, DO [facility] [address]     Non-protocol med:  Rx pended and routed for approval/denial

## 2023-11-11 RX ORDER — CITALOPRAM 20 MG/1
20 TABLET ORAL DAILY
Qty: 90 TABLET | Refills: 0 | Status: SHIPPED | OUTPATIENT
Start: 2023-11-11

## 2023-11-13 RX ORDER — CITALOPRAM HYDROBROMIDE 10 MG/1
TABLET ORAL
Qty: 30 TABLET | Refills: 0 | OUTPATIENT
Start: 2023-11-13

## 2024-01-02 ENCOUNTER — LAB ENCOUNTER (OUTPATIENT)
Dept: LAB | Age: 49
End: 2024-01-02
Attending: PHYSICIAN ASSISTANT
Payer: COMMERCIAL

## 2024-01-02 DIAGNOSIS — E03.9 MYXEDEMA HEART DISEASE: ICD-10-CM

## 2024-01-02 DIAGNOSIS — E34.9 ENDOCRINE DISORDER RELATED TO PUBERTY: Primary | ICD-10-CM

## 2024-01-02 DIAGNOSIS — I51.9 MYXEDEMA HEART DISEASE: ICD-10-CM

## 2024-01-02 LAB
DHEA-S SERPL-MCNC: 262.2 UG/DL
INSULIN SERPL-ACNC: 53.5 MU/L (ref 3–25)
T3FREE SERPL-MCNC: 3.05 PG/ML (ref 2.4–4.2)
T4 FREE SERPL-MCNC: 0.7 NG/DL (ref 0.8–1.7)
TESTOST SERPL-MCNC: 36.12 NG/DL
TSI SER-ACNC: 0.51 MIU/ML (ref 0.36–3.74)

## 2024-01-02 PROCEDURE — 82627 DEHYDROEPIANDROSTERONE: CPT

## 2024-01-02 PROCEDURE — 84482 T3 REVERSE: CPT

## 2024-01-02 PROCEDURE — 84481 FREE ASSAY (FT-3): CPT

## 2024-01-02 PROCEDURE — 84403 ASSAY OF TOTAL TESTOSTERONE: CPT

## 2024-01-02 PROCEDURE — 84443 ASSAY THYROID STIM HORMONE: CPT

## 2024-01-02 PROCEDURE — 84439 ASSAY OF FREE THYROXINE: CPT

## 2024-01-02 PROCEDURE — 36415 COLL VENOUS BLD VENIPUNCTURE: CPT

## 2024-01-02 PROCEDURE — 83525 ASSAY OF INSULIN: CPT

## 2024-01-04 LAB — REVERSE T3: 5.4 NG/DL

## 2024-01-28 DIAGNOSIS — F41.9 ANXIETY: ICD-10-CM

## 2024-01-31 RX ORDER — CITALOPRAM 20 MG/1
20 TABLET ORAL DAILY
Qty: 90 TABLET | Refills: 1 | Status: SHIPPED | OUTPATIENT
Start: 2024-01-31

## 2024-01-31 NOTE — TELEPHONE ENCOUNTER
Requesting Citalopram 20mg  Last OV: 9/23/23 VV  RTC: not noted  Last Rx'd 11/11/23 #90 with 0 refills    Future Appointments   Date Time Provider Department Center   2/9/2024  4:00 PM PF RIA [facility] [place]   2/12/2024  3:30 PM Stan Valles DO [facility] [address]       Non-protocol med:  Rx pended and routed for approval/denial

## 2024-02-28 ENCOUNTER — HOSPITAL ENCOUNTER (OUTPATIENT)
Dept: MAMMOGRAPHY | Age: 49
End: 2024-02-28
Attending: OBSTETRICS & GYNECOLOGY
Payer: COMMERCIAL

## 2024-02-28 ENCOUNTER — HOSPITAL ENCOUNTER (OUTPATIENT)
Dept: MAMMOGRAPHY | Age: 49
Discharge: HOME | End: 2024-02-28
Attending: OBSTETRICS & GYNECOLOGY
Payer: COMMERCIAL

## 2024-02-28 DIAGNOSIS — Z12.31 ENCOUNTER FOR SCREENING MAMMOGRAM FOR BREAST CANCER: ICD-10-CM

## 2024-02-28 PROCEDURE — 77063 BREAST TOMOSYNTHESIS BI: CPT | Performed by: OBSTETRICS & GYNECOLOGY

## 2024-02-28 PROCEDURE — 77067 SCR MAMMO BI INCL CAD: CPT | Performed by: OBSTETRICS & GYNECOLOGY

## 2024-08-11 DIAGNOSIS — F41.9 ANXIETY: ICD-10-CM

## 2024-08-14 ENCOUNTER — TELEPHONE (OUTPATIENT)
Dept: FAMILY MEDICINE CLINIC | Facility: CLINIC | Age: 49
End: 2024-08-14

## 2024-08-14 NOTE — TELEPHONE ENCOUNTER
Patient needs a refill on citalopram 20 MG Oral Tab sent to [facility].  Patient called the pharmacy and was told she was denied a refill.

## 2024-08-15 DIAGNOSIS — F41.9 ANXIETY: ICD-10-CM

## 2024-08-15 DIAGNOSIS — Z00.00 LABORATORY EXAMINATION ORDERED AS PART OF A ROUTINE GENERAL MEDICAL EXAMINATION: Primary | ICD-10-CM

## 2024-08-16 ENCOUNTER — TELEPHONE (OUTPATIENT)
Dept: FAMILY MEDICINE CLINIC | Facility: CLINIC | Age: 49
End: 2024-08-16

## 2024-08-16 RX ORDER — CITALOPRAM 20 MG/1
20 TABLET ORAL DAILY
Qty: 30 TABLET | Refills: 1 | Status: SHIPPED | OUTPATIENT
Start: 2024-08-16

## 2024-08-16 RX ORDER — CITALOPRAM 20 MG/1
20 TABLET ORAL DAILY
Qty: 90 TABLET | Refills: 1 | OUTPATIENT
Start: 2024-08-16

## 2024-08-16 NOTE — TELEPHONE ENCOUNTER
Patient will need refill on the Citalopram 20 mg sent to local [facility] pharmacy. Patient has scheduled an appointment.  She would also like lab orders placed to do prior to the appointment.  Future Appointments   Date Time Provider Department Center   10/7/2024  3:30 PM Stan Valles, DO [facility] [address]    Patient needed after 3:00 pm for appointment and this was first available.

## 2024-08-16 NOTE — TELEPHONE ENCOUNTER
Labs ordered  Rx sent until appointment  MCM sent to patient    Future Appointments   Date Time Provider Department Center   10/7/2024  3:30 PM Stan Valles, DO [facility] [address]

## 2024-08-16 NOTE — TELEPHONE ENCOUNTER
Requesting     Disp Refills Start End     citalopram 20 MG Oral Tab 90 tablet 1 1/31/2024 --    Sig - Route: Take 1 tablet (20 mg total) by mouth daily. - Oral    Sent to pharmacy as: Citalopram Hydrobromide 20 MG Oral Tablet ([employer]ele[employer])      Telemed 9/23/2023 for medication follow up  LOV: 1/18/2023 for physical  RTC: prn    Filled:     Dispensed Written Strength Quantity Refills Days Supply Provider Pharmacy    CITALOPRAM 20MG TABLETS 05/07/2024 01/31/2024  90 each  90 [facility]     No future appointments.     MCM sent to patient

## 2024-08-31 ENCOUNTER — LAB ENCOUNTER (OUTPATIENT)
Dept: LAB | Age: 49
End: 2024-08-31
Attending: PHYSICIAN ASSISTANT
Payer: COMMERCIAL

## 2024-08-31 DIAGNOSIS — N95.9 MENOPAUSAL PROBLEM: ICD-10-CM

## 2024-08-31 DIAGNOSIS — I51.9 MYXEDEMA HEART DISEASE: Primary | ICD-10-CM

## 2024-08-31 DIAGNOSIS — E34.9 ENDOCRINE DISORDER RELATED TO PUBERTY: ICD-10-CM

## 2024-08-31 DIAGNOSIS — E03.9 MYXEDEMA HEART DISEASE: Primary | ICD-10-CM

## 2024-08-31 LAB
ALBUMIN SERPL-MCNC: 4.2 G/DL (ref 3.2–4.8)
ALBUMIN/GLOB SERPL: 1.6 {RATIO} (ref 1–2)
ALP LIVER SERPL-CCNC: 45 U/L
ALT SERPL-CCNC: 16 U/L
ANION GAP SERPL CALC-SCNC: 6 MMOL/L (ref 0–18)
AST SERPL-CCNC: 18 U/L (ref ?–34)
BASOPHILS # BLD AUTO: 0.06 X10(3) UL (ref 0–0.2)
BASOPHILS NFR BLD AUTO: 1.2 %
BILIRUB SERPL-MCNC: 0.4 MG/DL (ref 0.3–1.2)
BUN BLD-MCNC: 11 MG/DL (ref 9–23)
CALCIUM BLD-MCNC: 9.3 MG/DL (ref 8.7–10.4)
CHLORIDE SERPL-SCNC: 107 MMOL/L (ref 98–112)
CHOLEST SERPL-MCNC: 159 MG/DL (ref ?–200)
CO2 SERPL-SCNC: 28 MMOL/L (ref 21–32)
CREAT BLD-MCNC: 0.69 MG/DL
DHEA-S SERPL-MCNC: 126.1 UG/DL
EGFRCR SERPLBLD CKD-EPI 2021: 108 ML/MIN/1.73M2 (ref 60–?)
EOSINOPHIL # BLD AUTO: 0.11 X10(3) UL (ref 0–0.7)
EOSINOPHIL NFR BLD AUTO: 2.1 %
ERYTHROCYTE [DISTWIDTH] IN BLOOD BY AUTOMATED COUNT: 12.6 %
ESTRADIOL SERPL-MCNC: 261.6 PG/ML
FASTING PATIENT LIPID ANSWER: YES
FASTING STATUS PATIENT QL REPORTED: YES
FSH SERPL-ACNC: 11.5 MIU/ML
GLOBULIN PLAS-MCNC: 2.6 G/DL (ref 2–3.5)
GLUCOSE BLD-MCNC: 90 MG/DL (ref 70–99)
HCT VFR BLD AUTO: 39.4 %
HDLC SERPL-MCNC: 77 MG/DL (ref 40–59)
HGB BLD-MCNC: 13.4 G/DL
IMM GRANULOCYTES # BLD AUTO: 0.02 X10(3) UL (ref 0–1)
IMM GRANULOCYTES NFR BLD: 0.4 %
INSULIN SERPL-ACNC: 5.5 MU/L (ref 3–25)
LDLC SERPL CALC-MCNC: 73 MG/DL (ref ?–100)
LYMPHOCYTES # BLD AUTO: 1.58 X10(3) UL (ref 1–4)
LYMPHOCYTES NFR BLD AUTO: 30.6 %
MCH RBC QN AUTO: 32.7 PG (ref 26–34)
MCHC RBC AUTO-ENTMCNC: 34 G/DL (ref 31–37)
MCV RBC AUTO: 96.1 FL
MONOCYTES # BLD AUTO: 0.35 X10(3) UL (ref 0.1–1)
MONOCYTES NFR BLD AUTO: 6.8 %
NEUTROPHILS # BLD AUTO: 3.05 X10 (3) UL (ref 1.5–7.7)
NEUTROPHILS # BLD AUTO: 3.05 X10(3) UL (ref 1.5–7.7)
NEUTROPHILS NFR BLD AUTO: 58.9 %
NONHDLC SERPL-MCNC: 82 MG/DL (ref ?–130)
OSMOLALITY SERPL CALC.SUM OF ELEC: 291 MOSM/KG (ref 275–295)
PLATELET # BLD AUTO: 232 10(3)UL (ref 150–450)
POTASSIUM SERPL-SCNC: 4 MMOL/L (ref 3.5–5.1)
PROT SERPL-MCNC: 6.8 G/DL (ref 5.7–8.2)
RBC # BLD AUTO: 4.1 X10(6)UL
SODIUM SERPL-SCNC: 141 MMOL/L (ref 136–145)
T3FREE SERPL-MCNC: 3.08 PG/ML (ref 2.4–4.2)
T4 FREE SERPL-MCNC: 0.8 NG/DL (ref 0.8–1.7)
TRIGL SERPL-MCNC: 42 MG/DL (ref 30–149)
TSI SER-ACNC: 0.47 MIU/ML (ref 0.55–4.78)
VLDLC SERPL CALC-MCNC: 6 MG/DL (ref 0–30)
WBC # BLD AUTO: 5.2 X10(3) UL (ref 4–11)

## 2024-08-31 PROCEDURE — 82627 DEHYDROEPIANDROSTERONE: CPT

## 2024-08-31 PROCEDURE — 80053 COMPREHEN METABOLIC PANEL: CPT | Performed by: FAMILY MEDICINE

## 2024-08-31 PROCEDURE — 82670 ASSAY OF TOTAL ESTRADIOL: CPT

## 2024-08-31 PROCEDURE — 36415 COLL VENOUS BLD VENIPUNCTURE: CPT

## 2024-08-31 PROCEDURE — 84443 ASSAY THYROID STIM HORMONE: CPT

## 2024-08-31 PROCEDURE — 80061 LIPID PANEL: CPT | Performed by: FAMILY MEDICINE

## 2024-08-31 PROCEDURE — 85025 COMPLETE CBC W/AUTO DIFF WBC: CPT | Performed by: FAMILY MEDICINE

## 2024-08-31 PROCEDURE — 84481 FREE ASSAY (FT-3): CPT

## 2024-08-31 PROCEDURE — 83525 ASSAY OF INSULIN: CPT

## 2024-08-31 PROCEDURE — 84482 T3 REVERSE: CPT

## 2024-08-31 PROCEDURE — 83001 ASSAY OF GONADOTROPIN (FSH): CPT

## 2024-08-31 PROCEDURE — 84403 ASSAY OF TOTAL TESTOSTERONE: CPT

## 2024-08-31 PROCEDURE — 84439 ASSAY OF FREE THYROXINE: CPT

## 2024-08-31 PROCEDURE — 84402 ASSAY OF FREE TESTOSTERONE: CPT

## 2024-08-31 PROCEDURE — 82679 ASSAY OF ESTRONE: CPT

## 2024-09-04 LAB
FREE TESTOST DIRECT: 1 PG/ML
TESTOSTERONE: 14 NG/DL

## 2024-09-05 LAB
ESTRONE: 97 PG/ML
REVERSE T3: 9.9 NG/DL

## 2024-12-10 DIAGNOSIS — F41.9 ANXIETY: ICD-10-CM

## 2024-12-12 ENCOUNTER — TELEPHONE (OUTPATIENT)
Dept: FAMILY MEDICINE | Age: 49
End: 2024-12-12

## 2024-12-12 RX ORDER — CITALOPRAM HYDROBROMIDE 20 MG/1
20 TABLET ORAL DAILY
Qty: 30 TABLET | Refills: 1 | Status: SHIPPED | OUTPATIENT
Start: 2024-12-12

## 2024-12-12 NOTE — TELEPHONE ENCOUNTER
Requesting Citalopram 20mg  LOV: 9/23/23 Telemedicine  RTC: not noted  Last Relevant Labs: 8/31/24  Filled: 8/16/24 #30 with 0 refills    Future Appointments   Date Time Provider Department Center   2/28/2025  8:30 AM Stan Valles DO [facility] [address]     Last appointment 9/23/23 Telemedicine. Patient has upcoming appointment in February. She has no showed or cancelled 4 appointments in the last year. Ok to refill until appt?

## 2025-01-23 ENCOUNTER — TELEPHONE (OUTPATIENT)
Facility: CLINIC | Age: 50
End: 2025-01-23

## 2025-01-30 ENCOUNTER — TELEPHONE (OUTPATIENT)
Facility: CLINIC | Age: 50
End: 2025-01-30

## 2025-02-05 ENCOUNTER — TELEPHONE (OUTPATIENT)
Dept: FAMILY MEDICINE CLINIC | Facility: CLINIC | Age: 50
End: 2025-02-05

## 2025-02-05 NOTE — TELEPHONE ENCOUNTER
Future Appointments   Date Time Provider Department Center   2/17/2025  2:00 PM Stan Valles DO [facility] [address]   2/24/2025  3:30 PM Jonathan Buck MD [facility] [address]   4/3/2025  3:30 PM Stan Valles DO [facility] [address]

## 2025-02-05 NOTE — TELEPHONE ENCOUNTER
Called patient to reschedule appointment as provider is not available.  Rescheduled appointment and patient will run out of medication before next appointment.    Future Appointments   Date Time Provider Department Center   2/24/2025  3:30 PM Jonathan Buck MD [facility] [address]   4/3/2025  3:30 PM Stan Valles DO [facility] [address]           Disp Refills Start End    CITALOPRAM 20 MG Oral Tab 30 tablet 1 12/12/2024 --    Sig - Route: TAKE 1 TABLET BY MOUTH EVERY DAY - Oral    Sent to pharmacy as: Citalopram Hydrobromide 20 MG Oral Tablet (CeleXA)      [facility] #[id number] [facility] [address] [facility] [address], [phone], [phone]

## 2025-02-05 NOTE — TELEPHONE ENCOUNTER
Patient hasn't been seen since 2023 and has cancelled or no showed her appointments this last year. Can we schedule her sooner than April? Ok to use SDA on 2/15/25

## 2025-02-14 DIAGNOSIS — F41.9 ANXIETY: ICD-10-CM

## 2025-02-14 RX ORDER — CITALOPRAM HYDROBROMIDE 20 MG/1
20 TABLET ORAL DAILY
Qty: 30 TABLET | Refills: 1 | Status: SHIPPED | OUTPATIENT
Start: 2025-02-14 | End: 2025-02-17

## 2025-02-15 PROBLEM — Z80.8 FAMILY HISTORY OF SKIN CANCER: Status: ACTIVE | Noted: 2025-02-15

## 2025-02-16 NOTE — PROGRESS NOTES
Note to patient: The 21st Century Cures Act makes medical notes like these available to patients in the interest of transparency. However, be advised this is a medical document. It is intended as peer to peer communication. It is written in medical language and may contain abbreviations or verbiage that are unfamiliar. It may appear blunt or direct. Medical documents are intended to carry relevant information, facts as evident, and the clinical opinion of the practitioner.         Chief Complaint   Patient presents with    Well Adult    Knee Pain     Bilateral knee pain - shooting pain left leg x one year       HPI:  Patient is here for her physical.     Hx of chronic stress.  She does perform well at her job.  She is a .  She does see a therapist which helps.  Denies any SI or HI.    Pt is on celexa 20mg daily- stable, no SE.    No change in appetite or weight.      Hypothyroid- she is on liothyronine. Alternative doctor is managing this.     fmhx of skin cancer in her mother- melanoma. Pt sees [facility].       Patient is here for evaluation and treatment of bilateral knee pain. Denies injury. The pain began couple years ago. It has inhibited her form running, left knee worse than right knee. The pain's location is along the medial and lateral joint line. Patient  describes the symptoms as aching. Symptoms improve with rest. Symptoms worsen with activity, stair climbing, kneeling, deep knee bending. The knee has not given out or felt unstable. The patient can bend and straighten the knee fully. Patient  not done PT or taken nsaids.   She used to run marathons. Stopped running now.   Walks on treadmill.   When she was 14 she had a injury of left leg- fracture with effusion of left knee    Smoking: none  Alcohol: occasionally   Drugs: none   Sexual hx: 1 partner   STD hx: declined  Occupation:   No family history of colon cancer or breast cancer.  Mammogram  in , due soon, order placed.   Colonoscopy- due for screening.   Pap smear: Up-to-date  Tdap: due - administered today   Diet: healthy diet  Exercise: daily - 5 days per week     Periods are regular, light.   She has developed some hot flashes.   Her goal weight is 130 lbs- she was last 130 lbs 4 years ago.     Wt Readings from Last 6 Encounters:   25 155 lb (70.3 kg)   24 165 lb (74.8 kg)   23 151 lb (68.5 kg)   23 141 lb (64 kg)   23 151 lb (68.5 kg)   23 152 lb (68.9 kg)           Review of Systems   Constitutional: Negative for fever, chills and fatigue. No distress.  HENT: Negative for hearing loss, congestion, sore throat, neck pain   Eyes: Negative for pain and visual disturbance.   Respiratory: Negative for cough, chest tightness, shortness of breath and wheezing.    Cardiovascular: Negative for chest pain, palpitations and leg swelling.   Gastrointestinal: Negative for nausea, vomiting, abdominal pain, diarrhea, blood in stool  Genitourinary: Negative for dysuria, hematuria and difficulty urinating.   Neurological: Negative for dizziness, syncope, weakness, numbness    Patient Active Problem List   Diagnosis    Contact dermatitis and other eczema, due to unspecified cause    Other specified cardiac dysrhythmias(427.89)    Simple goiter    Other malaise and fatigue    Allergic rhinitis    Hypothyroidism    Menorrhagia with regular cycle    S/P endometrial ablation - HS, D&C, Starla     Salmonella    Hyperglycemia    Family history of skin cancer       Past Medical History:    Allergic rhinitis    Anxiety    Cardiac dysrhythmia    Hypothyroidism     Past Surgical History:   Procedure Laterality Date      ,     [facility]    Endometrial biopsy - jar(s): 2  2019    Normal, Dr. Buck    Other surgical history  2016    Tummy [facility]    Skin surgery  2016    Tubal ligation  2017    Essure     Family History   Problem Relation Age of Onset     Other (Other) Father         car accident at 33yrs old    Cancer Mother         skin cancer     Heart Disease Paternal Grandfather         CAD    Skin cancer Paternal Grandmother         Malignant Melanoma of the skin      Social History     Socioeconomic History    Marital status:    Tobacco Use    Smoking status: Never     Passive exposure: Never    Smokeless tobacco: Never   Vaping Use    Vaping status: Never Used   Substance and Sexual Activity    Alcohol use: Yes    Drug use: No    Sexual activity: Yes     Partners: Male     Comment: [facility]   Other Topics Concern    Caffeine Concern No    Stress Concern No    Weight Concern Yes    Special Diet No    Exercise Yes    Seat Belt Yes     Social Drivers of Health      [facility]    Housing Stability       Current Outpatient Medications   Medication Sig Dispense Refill    citalopram 20 MG Oral Tab Take 1 tablet (20 mg total) by mouth daily. 90 tablet 2    Meloxicam 7.5 MG Oral Tab Take 1 tablet (7.5 mg total) by mouth daily as needed for Pain. 30 tablet 0    liothyronine 5 MCG Oral Tab Take 1 tablet (5 mcg total) by mouth daily.         Allergies  Allergies   Allergen Reactions    Gluten Flour     Morphine ITCHING       Health Maintenance  Immunizations:  Immunization History   Administered Date(s) Administered    Covid-19 Vaccine Pfizer 30 mcg/0.3 ml 02/07/2021, 02/28/2021, 10/25/2021    FLULAVAL 6 months & older 0.5 ml Prefilled syringe (38103) 09/06/2017    FLUZONE 6 months and older PFS 0.5 ml (92187) 08/22/2016, 09/06/2017    Influenza 09/28/2011, 10/09/2013, 08/23/2016, 10/10/2018    TDAP 02/17/2025   Deferred Date(s) Deferred    Influenza Vaccine Refused 01/18/2023    Influenza Vaccine, trivalent (IIV3), PF 0.5mL (07421) 02/17/2025         Physical Exam  BP 92/62   Pulse 58   Temp 97.2 °F (36.2 °C) (Temporal)   Resp 16   Ht 5' 2\" (1.575 m)   Wt 155 lb (70.3 kg)   LMP 01/30/2025    SpO2 99%   BMI 28.35 kg/m²   Constitutional: Oriented to person, place, and time. No distress.   HEENT:  Normocephalic and atraumatic. Hearing and tympanic membranes normal.  Nose normal. Oropharynx is clear and moist.   Eyes: Conjunctivae and EOM are normal. PERRLA. No scleral icterus.   Neck: No mass and no thyromegaly present.   Cardiovascular: Normal rate, regular rhythm and intact distal pulses.  No murmur, rubs or gallops.   Pulmonary/Chest: Effort normal and breath sounds normal. No respiratory distress. No wheezes, rhonchi or rales  Abdominal: Soft. Bowel sounds are normal. Non tender, no masses, no organomegaly or hernias.  Knees: b/l  normal on inspection. FROM, no palpable defect of the bursa/popliteal fossa, normal flexion and extension of lower leg,  no joint line tenderness. Normal patellar tracking.        Encounter Diagnoses   Name Primary?    S/P endometrial ablation - HS, D&C, Starla 2019     Anxiety     Hypothyroidism, unspecified type     Well adult exam Yes    Screening for colon cancer     Need for Tdap vaccination     Family history of skin cancer     Refused influenza vaccine     Breast cancer screening by mammogram     Chronic pain of both knees         1. S/P endometrial ablation - HS, D&C, Starla 2019    2. Anxiety  Anxiety - at goal, no suicidal or homicidal thoughts. Continue present management. Patient will call if any symptoms worsen. Patient understands risks that anti-depressants as well as anti-anxiety agents have been shown to paradoxically worsen anxiety and depression and trigger suicidal thoughts. If any of these thoughts are present patient will stop the medication(s) immediately and call the office. Patient understands and agrees to the above plan. Medication: Continue with the same medication(s).    - citalopram 20 MG Oral Tab; Take 1 tablet (20 mg total) by mouth daily.  Dispense: 90 tablet; Refill: 2    3. Hypothyroidism, unspecified type  Following alternative  doctor  Pt on liothyronine   Last labs are normal     4. Well adult exam  - -Discussed diet and exercise, counseled on vaccine and screening guidelines.   - CBC With Differential With Platelet  - Comp Metabolic Panel (14)  - Lipid Panel    5. Screening for colon cancer  - COLOGUARD COLON CANCER SCREENING (EXTERNAL)    6. Need for Tdap vaccination  Tdap given today.   - TdaP (Boostrix) Vaccine (> 7 Y)    7. Family history of skin cancer  Pt follows derm annualy for skin exam.     8. Refused influenza vaccine    9. Breast cancer screening by mammogram  - [facility] ROBB 2D+3D SCREENING BILAT (CPT=77067/81279); Future    10. Chronic pain of both knees  Suspect runner's knee b/l. R/o OA  Referred to PT for evaluation and treatment.   No alarming symptoms.   Advised to lose weight, meloxicamprn for pain, apply ice after activity, low impact exercises recommended.   - OP REFERRAL TO [facility]  - Meloxicam 7.5 MG Oral Tab; Take 1 tablet (7.5 mg total) by mouth daily as needed for Pain.  Dispense: 30 tablet; Refill: 0  - XR KNEE BILAT STANDING (CPT=73565); Future  - Physical Therapy Referral - [facility] Location        Orders Placed This Encounter   Procedures    CBC With Differential With Platelet    Comp Metabolic Panel (14)    Lipid Panel    Fluzone Trivalent Vaccine, 6mo+ (00272)    TdaP (Boostrix) Vaccine (> 7 Y)       Meds & Refills for this Visit:  Requested Prescriptions     Signed Prescriptions Disp Refills    citalopram 20 MG Oral Tab 90 tablet 2     Sig: Take 1 tablet (20 mg total) by mouth daily.    Meloxicam 7.5 MG Oral Tab 30 tablet 0     Sig: Take 1 tablet (7.5 mg total) by mouth daily as needed for Pain.       Imaging & Consults:  COLOGUARD COLON CANCER SCREENING (EXTERNAL)  OP REFERRAL TO [facility]  INFLUENZA VACCINE, TRI, PRESERV FREE, 0.5 ML  TETANUS, DIPHTHERIA TOXOIDS AND ACELLULAR PERTUSIS VACCINE (TDAP), >7 YEARS, IM USE  [employer] ROBB 2D+3D SCREENING BILAT  (IRR=11929/32379)  XR KNEE BILAT STANDING (CPT=73565)      Return in about 1 year (around 2/17/2026) for physical.    There are no Patient Instructions on file for this visit.    All questions were answered and the patient understands the plan.     Stan Valles DO        This note was prepared using Dragon Medical voice recognition dictation software. As a result errors may occur. When identified these errors have been corrected. While every attempt is made to correct errors during dictation discrepancies may still exist.

## 2025-02-17 ENCOUNTER — OFFICE VISIT (OUTPATIENT)
Dept: FAMILY MEDICINE CLINIC | Facility: CLINIC | Age: 50
End: 2025-02-17
Payer: COMMERCIAL

## 2025-02-17 VITALS
SYSTOLIC BLOOD PRESSURE: 92 MMHG | BODY MASS INDEX: 28.52 KG/M2 | HEART RATE: 58 BPM | OXYGEN SATURATION: 99 % | TEMPERATURE: 97 F | DIASTOLIC BLOOD PRESSURE: 62 MMHG | WEIGHT: 155 LBS | HEIGHT: 62 IN | RESPIRATION RATE: 16 BRPM

## 2025-02-17 DIAGNOSIS — Z00.00 WELL ADULT EXAM: Primary | ICD-10-CM

## 2025-02-17 DIAGNOSIS — Z23 NEED FOR TDAP VACCINATION: ICD-10-CM

## 2025-02-17 DIAGNOSIS — M25.561 CHRONIC PAIN OF BOTH KNEES: ICD-10-CM

## 2025-02-17 DIAGNOSIS — F41.9 ANXIETY: ICD-10-CM

## 2025-02-17 DIAGNOSIS — Z98.890 S/P ENDOMETRIAL ABLATION: ICD-10-CM

## 2025-02-17 DIAGNOSIS — Z28.21 REFUSED INFLUENZA VACCINE: ICD-10-CM

## 2025-02-17 DIAGNOSIS — G89.29 CHRONIC PAIN OF BOTH KNEES: ICD-10-CM

## 2025-02-17 DIAGNOSIS — E03.9 HYPOTHYROIDISM, UNSPECIFIED TYPE: ICD-10-CM

## 2025-02-17 DIAGNOSIS — Z80.8 FAMILY HISTORY OF SKIN CANCER: ICD-10-CM

## 2025-02-17 DIAGNOSIS — M25.562 CHRONIC PAIN OF BOTH KNEES: ICD-10-CM

## 2025-02-17 DIAGNOSIS — Z12.31 BREAST CANCER SCREENING BY MAMMOGRAM: ICD-10-CM

## 2025-02-17 DIAGNOSIS — Z12.11 SCREENING FOR COLON CANCER: ICD-10-CM

## 2025-02-17 RX ORDER — MELOXICAM 7.5 MG/1
7.5 TABLET ORAL DAILY PRN
Qty: 30 TABLET | Refills: 0 | Status: SHIPPED | OUTPATIENT
Start: 2025-02-17

## 2025-02-17 RX ORDER — CITALOPRAM HYDROBROMIDE 20 MG/1
20 TABLET ORAL DAILY
Qty: 90 TABLET | Refills: 2 | Status: SHIPPED | OUTPATIENT
Start: 2025-02-17

## 2025-02-20 ENCOUNTER — HOSPITAL ENCOUNTER (OUTPATIENT)
Dept: GENERAL RADIOLOGY | Age: 50
Discharge: HOME | End: 2025-02-20
Attending: FAMILY MEDICINE
Payer: COMMERCIAL

## 2025-02-20 DIAGNOSIS — M25.561 CHRONIC PAIN OF BOTH KNEES: ICD-10-CM

## 2025-02-20 DIAGNOSIS — M25.562 CHRONIC PAIN OF BOTH KNEES: ICD-10-CM

## 2025-02-20 DIAGNOSIS — G89.29 CHRONIC PAIN OF BOTH KNEES: ICD-10-CM

## 2025-02-20 PROCEDURE — 73565 X-RAY EXAM OF KNEES: CPT | Performed by: FAMILY MEDICINE

## 2025-02-24 ENCOUNTER — OFFICE VISIT (OUTPATIENT)
Facility: CLINIC | Age: 50
End: 2025-02-24
Payer: COMMERCIAL

## 2025-02-24 VITALS
HEIGHT: 62 IN | WEIGHT: 155 LBS | DIASTOLIC BLOOD PRESSURE: 66 MMHG | BODY MASS INDEX: 28.52 KG/M2 | SYSTOLIC BLOOD PRESSURE: 108 MMHG

## 2025-02-24 DIAGNOSIS — N95.1 PERIMENOPAUSAL SYMPTOMS: ICD-10-CM

## 2025-02-24 DIAGNOSIS — Z98.891 HISTORY OF 2 CESAREAN SECTIONS: ICD-10-CM

## 2025-02-24 DIAGNOSIS — Z98.890 S/P ENDOMETRIAL ABLATION: ICD-10-CM

## 2025-02-24 DIAGNOSIS — Z00.00 ANNUAL PHYSICAL EXAM: Primary | ICD-10-CM

## 2025-02-24 RX ORDER — NORETHINDRONE ACETATE AND ETHINYL ESTRADIOL, ETHINYL ESTRADIOL AND FERROUS FUMARATE 1MG-10(24)
1 KIT ORAL DAILY
Qty: 84 TABLET | Refills: 1 | Status: SHIPPED | OUTPATIENT
Start: 2025-02-24 | End: 2026-02-24

## 2025-02-24 NOTE — PROGRESS NOTES
GYN H&P     Genetic questionnaire reviewed with the patient and she will be referred for genetic counseling if the questionnaire had any positive results.    The [facility] intake form was also reviewed regarding contraception, menstrual periods, urinary health, and vaginal / sexual health    2025  3:32 PM    Chief Complaint   Patient presents with    Physical     Pt reports increase in hot flashes, irregular menses since summer 2024       HPI: Shruthi is a 49 year old  Patient's last menstrual period was 2025 (exact date).  (contraception: Essure) here for her annual gyn exam.     New Vasomotor symptoms.but still getting menses    Endometrial ablation     Menses are regular. Denies any pelvic or breast complaints.      Previous encounters and chart reviewed.     OB History    Para Term  AB Living   2 2 2     3   SAB IAB Ectopic Multiple Live Births         1 3      # Outcome Date GA Lbr Rashid/2nd Weight Sex Type Anes PTL Lv   2A Term 11 38w0d  6 lb 9 oz (2.977 kg) F Caesarean   MARCO   2B Term 11 38w0d  7 lb 5 oz (3.317 kg) M Caesarean   MARCO   1 Term 2009 39w0d  7 lb 8 oz (3.402 kg) M Caesarean   MARCO       GYN hx:   Menarche: 14  Period Cycle (Days): 24-28  Period Duration (Days): 1-3  Period Flow: light  Use of Birth Control (if yes, specify type): Essure  Date When Birth Control Last Used: Starla Ablation 10/29/19  Hx Prior Abnormal Pap: No  Pap Date: 23  Pap Result Notes: 23 Neg/Neg; 2017 neg,neg  (2013 neg,neg  2011 neg)  Follow Up Recommendation:       Past Medical History:    Allergic rhinitis    Anxiety    Cardiac dysrhythmia    Hypothyroidism     Past Surgical History:   Procedure Laterality Date      ,     [facility]    Endometrial biopsy - jar(s): 2  2019    Normal, Dr. Buck    Other surgical history  2016    Tummy Tuck    Skin surgery  2016    Tubal ligation  2017    [facility]      Allergies[1]  Medications Ordered Prior to Encounter[2]  Family History   Problem Relation Age of Onset    Other (Other) Father         car accident at 33yrs old    Cancer Mother         skin cancer     Heart Disease Paternal Grandfather         CAD    Skin cancer Paternal Grandmother         Malignant Melanoma of the skin      Social History     Socioeconomic History    Marital status:      Spouse name: Not on file    Number of children: Not on file    Years of education: Not on file    Highest education level: Not on file   Occupational History    Not on file   Tobacco Use    Smoking status: Never     Passive exposure: Never    Smokeless tobacco: Never   Vaping Use    Vaping status: Never Used   Substance and Sexual Activity    Alcohol use: Yes    Drug use: No    Sexual activity: Yes     Partners: Male     Comment: Essure   Other Topics Concern     Service Not Asked    Blood Transfusions Not Asked    Caffeine Concern No    Occupational Exposure Not Asked    Hobby Hazards Not Asked    Sleep Concern Not Asked    Stress Concern No    Weight Concern Yes    Special Diet No    Back Care Not Asked    Exercise Yes    Bike Helmet Not Asked    Seat Belt Yes    Self-Exams Not Asked   Social History Narrative    Not on file     Social Drivers of Health     Food Insecurity: Not on file   Transportation Needs: Not on file   Stress: Not on file   Housing Stability: Low Risk  (7/17/2021)    [facility]    Housing Stability     Mortgage Payment Concerns?: Not on file     Number of Places Lived in the Last Year: Not on file     Unstable Housing?: Not on file       ROS:     Review of Systems:  General: denies fevers, chills, fatigue and malaise.   Eyes: no visual changes, denies headaches  ENT: no complaints, denies earaches, runny nose, epistaxis, throat pain or sore throat  Respiratory: denies SOB, dyspnea, cough or wheezing  Cardiovascular: denies  chest pain, palpitations, exercise intolerance   GI: denies abdominal pain, diarrhea, constipation  : no complaints, denies dysuria, increased urinary frequency. Menses regular, no dysmenorrhea, no menorrhagia, no dyspareunia   Hematological/lymphatic: denies history of excessive bleeding or bruising, denies dizziness, lightheadedness.   Breast: denies rashes, skin changes, pain, lumps or discharge   Psychiatric: denies depression, changes in sleep patterns, anxiety  Endocrine:  hot flashes  Neurological: denies changes in sight, smell, hearing or taste. Denies seizures or tremors  Immunological: denies allergies, denies anaphylaxis, or swollen lymph nodes  Musculoskeletal: denies joint pain, morning stiffness, decreased range of motion         O /66   Ht 62\"   Wt 155 lb (70.3 kg)   LMP 02/22/2025 (Exact Date)   BMI 28.35 kg/m²         Wt Readings from Last 6 Encounters:   02/24/25 155 lb (70.3 kg)   02/17/25 155 lb (70.3 kg)   01/18/24 165 lb (74.8 kg)   08/12/23 151 lb (68.5 kg)   03/08/23 141 lb (64 kg)   01/18/23 151 lb (68.5 kg)     Exam:   GENERAL: well developed, well nourished, in no apparent distress, oriented.  SKIN: no rashes, no suspicious lesions  HEENT: normal  NECK: supple; no thyromegaly, no adenopathy  LUNGS: clear to auscultation  CARDIOVASCULAR: normal S1, S2, RRR  BREASTS: FC, soft, nontender, no palpable masses or nodes, no nipple discharge, no skin changes, no axillary adenopathy  ABDOMEN:  scars,  soft, non distended; non tender, no masses  PELVIC: External Genitalia: Normal appearing, no lesions.    Vagina: normal pink mucosa, no lesions, normal clear discharge.    Bladder well supported.  No  anterior or posterior hernias    Cervix: multiparous, no lesions , No CMT     Uterus: AVAF, mobile, non tender, normal size    Adnexa: non tender, no masses, normal size    Rectal: deferred  EXTREMITIES:  non tender without edema        A/P: Patient is 49 year old female with no complaints.  Here for well woman exam.     Patient counseled on:    Diet/exercise.      Self Breast Exams     Safe sex practices / and living environment     Vaccines:  Annual Flu, Tdap +/- Gardasil not recommended (up to 45 yrs).      Pneumococcal at 65 yrs old, Shingles at 60 yrs old          Pap: neg/neg - Year:    GC/Chlamydia:  na  Mammogram:  negative,    Dexa:  na  Colonoscopy: recommended again  Lipid / Cholesterol:         Collected 2024 10:25 AM       Status: Final result       Dx: Myxedema heart disease; Endocrine dis...    0 Result Notes      Component  Ref Range & Units 24 10:25 AM   FSH  No established range for female sex mIU/mL 11.5   Resulting [facility] ([facility])             Collected 2024 10:25 AM       Status: Final result       Dx: Myxedema heart disease; Endocrine dis...    2 Result Notes       1 Patient Communication      Component  Ref Range & Units 24 10:25 AM   Free T4  0.8 - 1.7 ng/dL 0.8        Collected 2024 10:25 AM       Status: Final result       Dx: Myxedema heart disease; Endocrine dis...    0 Result Notes      Component  Ref Range & Units 24 10:25 AM   Estrone  pg/mL 97   Comment:                                               Range                      Adult (Premenopausal)    27 - 231                Menstrual Cycle (1-10 days)    19 - 149                Menstrual Cycle (11-20 days)   32 - 176                Menstrual Cycle (21-30 days)   37 - 200                      Adult (Postmenopausal)    0 - 125        Meds This Visit:    Requested Prescriptions     Signed Prescriptions Disp Refills    Norethin-Eth Estrad-Fe Biphas (LO LOESTRIN FE) 1 MG-10 MCG / 10 MCG Oral Tab 84 tablet 1     Sig: Take 1 tablet by mouth daily.       1. Annual physical exam    2. S/P endometrial ablation - HS, D&C, Starla     3. History of 2  sections    4. Perimenopausal symptoms  - Norethin-Eth Estrad-Fe Biphas (LO LOESTRIN FE) 1 MG-10 MCG / 10 MCG Oral Tab; Take 1  tablet by mouth daily.  Dispense: 84 tablet; Refill: 1    Continuous cycling on Lo Lo E for symptoms - debated observing until periods stopped but night sweats very very bad.     Return in about 3 months (around 5/24/2025) for Office Visit.    Jonathan Buck MD   2/24/2025  3:32 PM       This note was created by [employer] voice recognition. Errors in content may be related to improper recognition by the system; efforts to review and correct have been done but errors may still exist. Please contact me with any questions.    Note to patient and family   The 21st Century Cures Act makes medical notes available to patients in the interest of transparency.  However, please be advised that this is a medical document.  It is intended as [id number] communication.  It is written in medical language which may contain abbreviations or verbiage that are technical and unfamiliar.  It may appear blunt or direct.  Medical documents are intended to carry relevant information, facts as evident, and the clinical opinion of the practitioner.           [1]   Allergies  Allergen Reactions    Gluten Flour     Morphine ITCHING   [2]   Current Outpatient Medications on File Prior to Visit   Medication Sig Dispense Refill    citalopram 20 MG Oral Tab Take 1 tablet (20 mg total) by mouth daily. 90 tablet 2    Meloxicam 7.5 MG Oral Tab Take 1 tablet (7.5 mg total) by mouth daily as needed for Pain. 30 tablet 0    liothyronine 5 MCG Oral Tab Take 1 tablet (5 mcg total) by mouth daily.       No current facility-administered medications on file prior to visit.

## 2025-03-05 ENCOUNTER — HOSPITAL ENCOUNTER (OUTPATIENT)
Dept: MAMMOGRAPHY | Age: 50
Discharge: HOME | End: 2025-03-05
Attending: FAMILY MEDICINE
Payer: COMMERCIAL

## 2025-03-05 DIAGNOSIS — Z12.31 BREAST CANCER SCREENING BY MAMMOGRAM: ICD-10-CM

## 2025-03-05 PROCEDURE — 77063 BREAST TOMOSYNTHESIS BI: CPT | Performed by: FAMILY MEDICINE

## 2025-03-05 PROCEDURE — 77067 SCR MAMMO BI INCL CAD: CPT | Performed by: FAMILY MEDICINE

## 2025-03-07 LAB — AMB EXT COLOGUARD RESULT: NEGATIVE

## 2025-03-21 DIAGNOSIS — M25.562 CHRONIC PAIN OF BOTH KNEES: ICD-10-CM

## 2025-03-21 DIAGNOSIS — G89.29 CHRONIC PAIN OF BOTH KNEES: ICD-10-CM

## 2025-03-21 DIAGNOSIS — M25.561 CHRONIC PAIN OF BOTH KNEES: ICD-10-CM

## 2025-03-21 RX ORDER — MELOXICAM 7.5 MG/1
7.5 TABLET ORAL
Qty: 30 TABLET | Refills: 0 | Status: SHIPPED | OUTPATIENT
Start: 2025-03-21

## 2025-03-21 NOTE — TELEPHONE ENCOUNTER
Requesting Meloxicam 7.5mg  Last OV: 2/17/25 Physical  RTC: 1 year  Last Rx'd 2/17/25 #30 with 0 refills    Future Appointments   Date Time Provider Department Center   4/7/2025 12:40 PM PF RIA [facility] [place]   6/16/2025  1:00 PM Jonathan Buck MD [facility] [address]       Non-protocol med:  Rx pended and routed for approval/denial

## 2025-04-07 ENCOUNTER — HOSPITAL ENCOUNTER (OUTPATIENT)
Dept: MAMMOGRAPHY | Age: 50
Discharge: HOME | End: 2025-04-07
Attending: FAMILY MEDICINE
Payer: COMMERCIAL

## 2025-04-07 ENCOUNTER — HOSPITAL ENCOUNTER (OUTPATIENT)
Dept: ULTRASOUND IMAGING | Age: 50
Discharge: HOME | End: 2025-04-07
Attending: FAMILY MEDICINE
Payer: COMMERCIAL

## 2025-04-07 DIAGNOSIS — R92.2 INCONCLUSIVE MAMMOGRAM: ICD-10-CM

## 2025-04-07 PROCEDURE — 76642 ULTRASOUND BREAST LIMITED: CPT | Performed by: FAMILY MEDICINE

## 2025-04-07 PROCEDURE — 77065 DX MAMMO INCL CAD UNI: CPT | Performed by: FAMILY MEDICINE

## 2025-04-07 PROCEDURE — 77061 BREAST TOMOSYNTHESIS UNI: CPT | Performed by: FAMILY MEDICINE

## 2025-04-29 DIAGNOSIS — F41.9 ANXIETY: ICD-10-CM

## 2025-04-29 RX ORDER — CITALOPRAM HYDROBROMIDE 20 MG/1
20 TABLET ORAL DAILY
Qty: 30 TABLET | Refills: 0 | Status: SHIPPED | OUTPATIENT
Start: 2025-04-29

## 2025-05-02 ENCOUNTER — TELEPHONE (OUTPATIENT)
Dept: FAMILY MEDICINE CLINIC | Facility: CLINIC | Age: 50
End: 2025-05-02

## 2025-05-02 NOTE — TELEPHONE ENCOUNTER
Received cologuard result from 3/7/2025.   Result is Negative.   MCM to pt.   Chart updated.   Result given to MD to sign and scan.

## 2025-05-07 DIAGNOSIS — N95.1 PERIMENOPAUSAL SYMPTOMS: ICD-10-CM

## 2025-05-13 RX ORDER — NORETHINDRONE ACETATE AND ETHINYL ESTRADIOL, ETHINYL ESTRADIOL AND FERROUS FUMARATE 1MG-10(24)
1 KIT ORAL DAILY
Qty: 84 TABLET | Refills: 3 | Status: SHIPPED | OUTPATIENT
Start: 2025-05-13 | End: 2026-05-13

## 2025-05-30 DIAGNOSIS — F41.9 ANXIETY: ICD-10-CM

## 2025-06-02 RX ORDER — CITALOPRAM HYDROBROMIDE 20 MG/1
20 TABLET ORAL DAILY
Qty: 90 TABLET | Refills: 0 | Status: SHIPPED | OUTPATIENT
Start: 2025-06-02

## 2025-06-02 NOTE — TELEPHONE ENCOUNTER
Disp Refills Start End    CITALOPRAM 20 MG Oral Tab 30 tablet 0 4/29/2025 --    Sig - Route: TAKE 1 TABLET BY MOUTH EVERY DAY - Oral    Sent to pharmacy as: Citalopram Hydrobromide 20 MG Oral Tablet (CeleXA)    Notes to Pharmacy: ZERO refills remain on this prescription. Your patient is requesting advance approval of refills for this medication to PREVENT ANY MISSED DOSES    E-Prescribing Status: Receipt confirmed by pharmacy (4/29/2025 11:31 PM CDT)      Last OV 2/17/25  Future Appointments   Date Time Provider Department Center   6/16/2025  1:00 PM Jonathan Buck MD [facility] [address]        Psychiatric Non-Scheduled (Anti-Anxiety) [id number]/30/2025 08:00 AM   Protocol Details In person appointment or virtual visit in the past 6 mos or appointment in next 3 mos    Depression Screening completed within the past 12 months    Medication is active on med list        Rx sent per protocol

## 2025-06-23 ENCOUNTER — OFFICE VISIT (OUTPATIENT)
Facility: CLINIC | Age: 50
End: 2025-06-23
Payer: COMMERCIAL

## 2025-06-23 VITALS
HEIGHT: 62 IN | BODY MASS INDEX: 23.92 KG/M2 | SYSTOLIC BLOOD PRESSURE: 104 MMHG | DIASTOLIC BLOOD PRESSURE: 62 MMHG | WEIGHT: 130 LBS

## 2025-06-23 DIAGNOSIS — Z92.29 HISTORY OF POSTMENOPAUSAL HRT: ICD-10-CM

## 2025-06-23 DIAGNOSIS — N95.1 VASOMOTOR SYMPTOMS DUE TO MENOPAUSE: Primary | ICD-10-CM

## 2025-06-23 RX ORDER — PROGESTERONE 100 MG/1
100 CAPSULE ORAL AS DIRECTED
Qty: 90 CAPSULE | Refills: 2 | Status: SHIPPED | OUTPATIENT
Start: 2025-06-23

## 2025-06-23 RX ORDER — ESTRADIOL 0.5 MG/1
0.5 TABLET ORAL DAILY
Qty: 90 TABLET | Refills: 1 | Status: SHIPPED | OUTPATIENT
Start: 2025-06-23

## 2025-06-23 NOTE — PROGRESS NOTES
Gynecology Office Visit    The patient was offered a medical chaperone during the visit.    Shruthi Hernandez is a 50 year old female  Patient's last menstrual period was 2025 (exact date). (contraception:  Essure)     HPI:     Chief Complaint   Patient presents with    Follow - Up     Here for ocp fu. States the ocp helped with hot flashes. However she does not wish to continue ocp and would like to try an estrogen patch .       No periods since continuous cycling of Lo LoEstrin - wants to try HRT   All VM symptoms resolved on OCP's  See note from 25        Chart and previous encounters reviewed.  HISTORY:  Past Medical History[1]   Past Surgical History[2]   Family History[3]   Social History: Short Social Hx on File[4]     Medications (Active prior to today's visit):  Current Medications[5]    Allergies:  Allergies[6]    Gyn:  Menarche: 14  Period Cycle (Days): 24-28  Period Duration (Days): 1-3  Period Flow: light  Use of Birth Control (if yes, specify type): Essure  Date When Birth Control Last Used: Starla Ablation 10/29/19  Hx Prior Abnormal Pap: No  Pap Date: 23  Pap Result Notes: 23 Neg/Neg; 2017 neg,neg  (2013 neg,neg  2011 neg)  Follow Up Recommendation:     OB Hx:  OB History    Para Term  AB Living   2 2 2   3   SAB IAB Ectopic Multiple Live Births      1 3      # Outcome Date GA Lbr Rashid/2nd Weight Sex Type Anes PTL Lv   2A Term 11 38w0d  6 lb 9 oz (2.977 kg) F Caesarean   MARCO   2B Term 11 38w0d  7 lb 5 oz (3.317 kg) M Caesarean   MARCO   1 Term 2009 39w0d  7 lb 8 oz (3.402 kg) M Caesarean   MARCO         ROS:     10 point ROS completed and was negative, except for pertinent positive and negatives stated in the HPI.    PHYSICAL EXAM:   /62   Ht 62\"   Wt 130 lb (59 kg)   LMP 2025 (Exact Date)   BMI 23.78 kg/m²      Wt Readings from Last 6 Encounters:   25 130 lb (59 kg)   25 155 lb (70.3 kg)   25  155 lb (70.3 kg)   01/18/24 165 lb (74.8 kg)   08/12/23 151 lb (68.5 kg)   03/08/23 141 lb (64 kg)        Gen:  Oriented, in no acute distress       ASSESSMENT/PLAN:     1. Vasomotor symptoms due to menopause  - estradiol (ESTRACE) 0.5 MG Oral Tab; Take 1 tablet (0.5 mg total) by mouth daily.  Dispense: 90 tablet; Refill: 1  - progesterone 100 MG Oral Cap; Take 1 capsule (100 mg total) by mouth As Directed.  Dispense: 90 capsule; Refill: 2    2. History of postmenopausal HRT  - estradiol (ESTRACE) 0.5 MG Oral Tab; Take 1 tablet (0.5 mg total) by mouth daily.  Dispense: 90 tablet; Refill: 1  - progesterone 100 MG Oral Cap; Take 1 capsule (100 mg total) by mouth As Directed.  Dispense: 90 capsule; Refill: 2        Meds This Visit:  Requested Prescriptions     Signed Prescriptions Disp Refills    estradiol (ESTRACE) 0.5 MG Oral Tab 90 tablet 1     Sig: Take 1 tablet (0.5 mg total) by mouth daily.    progesterone 100 MG Oral Cap 90 capsule 2     Sig: Take 1 capsule (100 mg total) by mouth As Directed.       Imaging & Referrals:  None     Return in about 8 months (around 2/23/2026).      Jonathan Buck MD  6/23/2025  1:40 PM         This note was created by [employer] Slides voice recognition. Errors in content may be related to improper recognition by the system; efforts to review and correct have been done but errors may still exist. Please contact me with any questions.    Note to patient and family   The 21st Century Cures Act makes medical notes available to patients in the interest of transparency.  However, please be advised that this is a medical document.  It is intended as [id number] communication.  It is written and medical language may contain abbreviations or verbiage that are technical and unfamiliar.  It may appear blunt or direct.  Medical documents are intended to carry relevant information, facts as evident, and the clinical opinion of the practitioner.           [1]   Past Medical History:   Allergic  rhinitis    Anxiety    Cardiac dysrhythmia    Hypothyroidism   [2]   Past Surgical History:  Procedure Laterality Date      ,     Edward    Endometrial biopsy - jar(s): 2  2019    Normal, Dr. Buck    Other surgical history  2016    Tummy Tuck    Skin surgery  2016    Tubal ligation  2017    Essure   [3]   Family History  Problem Relation Age of Onset    Other (Other) Father         car accident at 33yrs old    Cancer Mother         skin cancer     Heart Disease Paternal Grandfather         CAD    Skin cancer Paternal Grandmother         Malignant Melanoma of the skin    [4]   Social History  Socioeconomic History    Marital status:    Tobacco Use    Smoking status: Never     Passive exposure: Never    Smokeless tobacco: Never   Vaping Use    Vaping status: Never Used   Substance and Sexual Activity    Alcohol use: Yes    Drug use: No    Sexual activity: Yes     Partners: Male     Comment: Essure   Other Topics Concern    Caffeine Concern No    Stress Concern No    Weight Concern Yes    Special Diet No    Exercise Yes    Seat Belt Yes     Social Drivers of Health      Received from [facility]    Housing Stability   [5]   Current Outpatient Medications   Medication Sig Dispense Refill    estradiol (ESTRACE) 0.5 MG Oral Tab Take 1 tablet (0.5 mg total) by mouth daily. 90 tablet 1    progesterone 100 MG Oral Cap Take 1 capsule (100 mg total) by mouth As Directed. 90 capsule 2    citalopram 20 MG Oral Tab TAKE 1 TABLET BY MOUTH EVERY DAY 90 tablet 0    liothyronine 5 MCG Oral Tab Take 1 tablet (5 mcg total) by mouth daily.      MELOXICAM 7.5 MG Oral Tab TAKE 1 TABLET(7.5 MG) BY MOUTH DAILY AS NEEDED FOR PAIN (Patient not taking: Reported on 2025) 30 tablet 0   [6]   Allergies  Allergen Reactions    Gluten Flour     Morphine ITCHING

## 2025-06-29 DIAGNOSIS — F41.9 ANXIETY: ICD-10-CM

## 2025-07-02 RX ORDER — CITALOPRAM HYDROBROMIDE 20 MG/1
20 TABLET ORAL DAILY
Qty: 90 TABLET | Refills: 0 | OUTPATIENT
Start: 2025-07-02

## 2025-07-02 NOTE — TELEPHONE ENCOUNTER
Duplicate request - Rx sent 6/2/25 #90    citalopram 20 MG Oral Tab 90 tablet 0 6/2/2025 --    Sig - Route: TAKE 1 TABLET BY MOUTH EVERY DAY - Oral    Sent to pharmacy as: Citalopram Hydrobromide 20 MG Oral Tablet (CeleXA)    E-Prescribing Status: Receipt confirmed by pharmacy (6/2/2025  2:16 PM CDT)      Associated Diagnoses    Anxiety        Pharmacy    [id number] [place] [address] [address] AT [facility] [address], [phone], [phone]

## (undated) NOTE — ED AVS SNAPSHOT
Carmencita Rubinstein   MRN: [id number]    Department:  [facility] Emergency Department in [place]   Date of Visit:  12/11/2019           Disclosure     Insurance plans vary and the physician(s) referred by the ER may not be covered by your plan.  Please cont tell this physician (or your personal doctor if your instructions are to return to your personal doctor) about any new or lasting problems. The primary care or specialist physician will see patients referred from the [facility] Emergency Department.  Rodolfo Hartley